# Patient Record
Sex: MALE | Race: WHITE | Employment: OTHER | URBAN - METROPOLITAN AREA
[De-identification: names, ages, dates, MRNs, and addresses within clinical notes are randomized per-mention and may not be internally consistent; named-entity substitution may affect disease eponyms.]

---

## 2017-01-04 PROBLEM — G47.33 OBSTRUCTIVE SLEEP APNEA: Status: ACTIVE | Noted: 2017-01-04

## 2017-02-20 ENCOUNTER — HOSPITAL ENCOUNTER (OUTPATIENT)
Dept: CT IMAGING | Age: 72
Discharge: HOME | End: 2017-02-20
Attending: UROLOGY | Admitting: UROLOGY

## 2017-02-20 DIAGNOSIS — N28.89 OTHER SPECIFIED DISORDER OF KIDNEY AND URETER: ICD-10-CM

## 2018-02-23 PROBLEM — G93.40 ENCEPHALOPATHY ACUTE: Status: ACTIVE | Noted: 2018-02-23

## 2018-03-06 ENCOUNTER — HOSPITAL ENCOUNTER (OUTPATIENT)
Dept: ULTRASOUND IMAGING | Age: 73
Discharge: HOME | End: 2018-03-06
Attending: UROLOGY | Admitting: UROLOGY

## 2018-03-06 DIAGNOSIS — N28.89 OTHER SPECIFIED DISORDERS OF KIDNEY AND URETER: ICD-10-CM

## 2018-03-07 ENCOUNTER — HOSPITAL ENCOUNTER (OUTPATIENT)
Dept: INTERVENTIONAL RADIOLOGY/VASCULAR | Age: 73
Discharge: HOME | End: 2018-04-18
Attending: RADIOLOGY | Admitting: RADIOLOGY

## 2018-05-04 PROBLEM — E04.2 MULTINODULAR GOITER (NONTOXIC): Status: ACTIVE | Noted: 2018-05-04

## 2018-11-13 ENCOUNTER — HOSPITAL ENCOUNTER (OUTPATIENT)
Dept: CARDIAC CATH/INVASIVE PROCEDURES | Age: 73
Setting detail: OBSERVATION
Discharge: HOME | End: 2018-11-15
Attending: INTERNAL MEDICINE | Admitting: INTERNAL MEDICINE
Payer: MEDICARE

## 2018-11-13 PROBLEM — Q24.9 CARDIAC ABNORMALITY: Status: ACTIVE | Noted: 2018-11-13

## 2018-11-13 PROBLEM — I73.9 PAD (PERIPHERAL ARTERY DISEASE) (HCC): Status: ACTIVE | Noted: 2018-11-13

## 2018-11-13 LAB
ACTIVATED CLOTTING TIME, LOW RANGE: 253 SEC
ACTIVATED CLOTTING TIME, LOW RANGE: 313 SEC
ANION GAP SERPL CALCULATED.3IONS-SCNC: 11 MMOL/L (ref 4–16)
APTT: 28.9 SECONDS (ref 21.2–33)
BUN BLDV-MCNC: 32 MG/DL (ref 6–23)
CALCIUM SERPL-MCNC: 8.8 MG/DL (ref 8.3–10.6)
CHLORIDE BLD-SCNC: 106 MMOL/L (ref 99–110)
CO2: 27 MMOL/L (ref 21–32)
CREAT SERPL-MCNC: 1.4 MG/DL (ref 0.9–1.3)
ESTIMATED AVERAGE GLUCOSE: 194 MG/DL
GFR AFRICAN AMERICAN: 60 ML/MIN/1.73M2
GFR NON-AFRICAN AMERICAN: 49 ML/MIN/1.73M2
GLUCOSE BLD-MCNC: 135 MG/DL (ref 70–99)
GLUCOSE BLD-MCNC: 167 MG/DL (ref 70–99)
GLUCOSE BLD-MCNC: 176 MG/DL (ref 70–99)
GLUCOSE BLD-MCNC: 195 MG/DL (ref 70–99)
HBA1C MFR BLD: 8.4 % (ref 4.2–6.3)
HCT VFR BLD CALC: 35.9 % (ref 42–52)
HEMOGLOBIN: 11.7 GM/DL (ref 13.5–18)
INR BLD: 0.99 INDEX
MCH RBC QN AUTO: 30.6 PG (ref 27–31)
MCHC RBC AUTO-ENTMCNC: 32.6 % (ref 32–36)
MCV RBC AUTO: 94 FL (ref 78–100)
PDW BLD-RTO: 14.5 % (ref 11.7–14.9)
PLATELET # BLD: 181 K/CU MM (ref 140–440)
PMV BLD AUTO: 9.6 FL (ref 7.5–11.1)
POTASSIUM SERPL-SCNC: 4.2 MMOL/L (ref 3.5–5.1)
PROTHROMBIN TIME: 11.5 SECONDS (ref 9.12–12.5)
RBC # BLD: 3.82 M/CU MM (ref 4.6–6.2)
SODIUM BLD-SCNC: 144 MMOL/L (ref 135–145)
WBC # BLD: 11.2 K/CU MM (ref 4–10.5)

## 2018-11-13 PROCEDURE — 85027 COMPLETE CBC AUTOMATED: CPT

## 2018-11-13 PROCEDURE — 80048 BASIC METABOLIC PNL TOTAL CA: CPT

## 2018-11-13 PROCEDURE — 6370000000 HC RX 637 (ALT 250 FOR IP)

## 2018-11-13 PROCEDURE — 2140000000 HC CCU INTERMEDIATE R&B

## 2018-11-13 PROCEDURE — 6370000000 HC RX 637 (ALT 250 FOR IP): Performed by: INTERNAL MEDICINE

## 2018-11-13 PROCEDURE — 85347 COAGULATION TIME ACTIVATED: CPT

## 2018-11-13 PROCEDURE — G0378 HOSPITAL OBSERVATION PER HR: HCPCS

## 2018-11-13 PROCEDURE — 82962 GLUCOSE BLOOD TEST: CPT

## 2018-11-13 PROCEDURE — 6360000002 HC RX W HCPCS

## 2018-11-13 PROCEDURE — 2709999900 HC NON-CHARGEABLE SUPPLY

## 2018-11-13 PROCEDURE — 37228 HC TIB PER TERRITORY PLASTY: CPT

## 2018-11-13 PROCEDURE — C1725 CATH, TRANSLUMIN NON-LASER: HCPCS

## 2018-11-13 PROCEDURE — 2580000003 HC RX 258: Performed by: INTERNAL MEDICINE

## 2018-11-13 PROCEDURE — 83036 HEMOGLOBIN GLYCOSYLATED A1C: CPT

## 2018-11-13 PROCEDURE — 2500000003 HC RX 250 WO HCPCS

## 2018-11-13 PROCEDURE — C1887 CATHETER, GUIDING: HCPCS

## 2018-11-13 PROCEDURE — C1769 GUIDE WIRE: HCPCS

## 2018-11-13 PROCEDURE — 75710 ARTERY X-RAYS ARM/LEG: CPT

## 2018-11-13 PROCEDURE — 85610 PROTHROMBIN TIME: CPT

## 2018-11-13 PROCEDURE — 6360000004 HC RX CONTRAST MEDICATION

## 2018-11-13 PROCEDURE — 85730 THROMBOPLASTIN TIME PARTIAL: CPT

## 2018-11-13 PROCEDURE — C1894 INTRO/SHEATH, NON-LASER: HCPCS

## 2018-11-13 PROCEDURE — C1760 CLOSURE DEV, VASC: HCPCS

## 2018-11-13 RX ORDER — ONDANSETRON 2 MG/ML
4 INJECTION INTRAMUSCULAR; INTRAVENOUS EVERY 6 HOURS PRN
Status: DISCONTINUED | OUTPATIENT
Start: 2018-11-13 | End: 2018-11-15 | Stop reason: HOSPADM

## 2018-11-13 RX ORDER — OXYCODONE HYDROCHLORIDE AND ACETAMINOPHEN 5; 325 MG/1; MG/1
1 TABLET ORAL EVERY 4 HOURS PRN
Status: DISCONTINUED | OUTPATIENT
Start: 2018-11-13 | End: 2018-11-15 | Stop reason: HOSPADM

## 2018-11-13 RX ORDER — PANTOPRAZOLE SODIUM 40 MG/1
40 TABLET, DELAYED RELEASE ORAL DAILY
Status: DISCONTINUED | OUTPATIENT
Start: 2018-11-13 | End: 2018-11-15 | Stop reason: HOSPADM

## 2018-11-13 RX ORDER — DEXTROSE MONOHYDRATE 25 G/50ML
12.5 INJECTION, SOLUTION INTRAVENOUS PRN
Status: DISCONTINUED | OUTPATIENT
Start: 2018-11-13 | End: 2018-11-13 | Stop reason: SDUPTHER

## 2018-11-13 RX ORDER — CARVEDILOL 25 MG/1
25 TABLET ORAL 2 TIMES DAILY WITH MEALS
Status: DISCONTINUED | OUTPATIENT
Start: 2018-11-13 | End: 2018-11-15 | Stop reason: HOSPADM

## 2018-11-13 RX ORDER — CLOPIDOGREL BISULFATE 75 MG/1
75 TABLET ORAL DAILY
Status: DISCONTINUED | OUTPATIENT
Start: 2018-11-13 | End: 2018-11-15 | Stop reason: HOSPADM

## 2018-11-13 RX ORDER — DIAZEPAM 5 MG/1
5 TABLET ORAL ONCE
Status: COMPLETED | OUTPATIENT
Start: 2018-11-13 | End: 2018-11-13

## 2018-11-13 RX ORDER — DEXTROSE MONOHYDRATE 50 MG/ML
100 INJECTION, SOLUTION INTRAVENOUS PRN
Status: DISCONTINUED | OUTPATIENT
Start: 2018-11-13 | End: 2018-11-13 | Stop reason: SDUPTHER

## 2018-11-13 RX ORDER — DEXTROSE MONOHYDRATE 25 G/50ML
12.5 INJECTION, SOLUTION INTRAVENOUS PRN
Status: DISCONTINUED | OUTPATIENT
Start: 2018-11-13 | End: 2018-11-15 | Stop reason: HOSPADM

## 2018-11-13 RX ORDER — GLIMEPIRIDE 4 MG/1
4 TABLET ORAL
Status: DISCONTINUED | OUTPATIENT
Start: 2018-11-14 | End: 2018-11-13

## 2018-11-13 RX ORDER — GLIMEPIRIDE 1 MG/1
1 TABLET ORAL
Status: DISCONTINUED | OUTPATIENT
Start: 2018-11-14 | End: 2018-11-15 | Stop reason: HOSPADM

## 2018-11-13 RX ORDER — NICOTINE POLACRILEX 4 MG
15 LOZENGE BUCCAL PRN
Status: DISCONTINUED | OUTPATIENT
Start: 2018-11-13 | End: 2018-11-15 | Stop reason: HOSPADM

## 2018-11-13 RX ORDER — ACETAMINOPHEN 325 MG/1
650 TABLET ORAL EVERY 4 HOURS PRN
Status: DISCONTINUED | OUTPATIENT
Start: 2018-11-13 | End: 2018-11-15 | Stop reason: HOSPADM

## 2018-11-13 RX ORDER — MECLIZINE HCL 12.5 MG/1
12.5 TABLET ORAL 2 TIMES DAILY
Status: DISCONTINUED | OUTPATIENT
Start: 2018-11-13 | End: 2018-11-14

## 2018-11-13 RX ORDER — DEXTROSE MONOHYDRATE 50 MG/ML
100 INJECTION, SOLUTION INTRAVENOUS PRN
Status: DISCONTINUED | OUTPATIENT
Start: 2018-11-13 | End: 2018-11-15 | Stop reason: HOSPADM

## 2018-11-13 RX ORDER — ZOLPIDEM TARTRATE 5 MG/1
5 TABLET ORAL NIGHTLY PRN
Status: DISCONTINUED | OUTPATIENT
Start: 2018-11-13 | End: 2018-11-15 | Stop reason: HOSPADM

## 2018-11-13 RX ORDER — SODIUM CHLORIDE 0.9 % (FLUSH) 0.9 %
10 SYRINGE (ML) INJECTION EVERY 12 HOURS SCHEDULED
Status: DISCONTINUED | OUTPATIENT
Start: 2018-11-13 | End: 2018-11-15 | Stop reason: HOSPADM

## 2018-11-13 RX ORDER — ACETAMINOPHEN 500 MG
500 TABLET ORAL EVERY 6 HOURS PRN
COMMUNITY
End: 2019-01-01 | Stop reason: ALTCHOICE

## 2018-11-13 RX ORDER — ATROPINE SULFATE 0.4 MG/ML
0.5 AMPUL (ML) INJECTION
Status: DISPENSED | OUTPATIENT
Start: 2018-11-13 | End: 2018-11-13

## 2018-11-13 RX ORDER — BUSPIRONE HYDROCHLORIDE 5 MG/1
5 TABLET ORAL 2 TIMES DAILY PRN
Status: DISCONTINUED | OUTPATIENT
Start: 2018-11-13 | End: 2018-11-15 | Stop reason: HOSPADM

## 2018-11-13 RX ORDER — TAMSULOSIN HYDROCHLORIDE 0.4 MG/1
0.4 CAPSULE ORAL NIGHTLY
Status: DISCONTINUED | OUTPATIENT
Start: 2018-11-13 | End: 2018-11-15 | Stop reason: HOSPADM

## 2018-11-13 RX ORDER — ONDANSETRON 2 MG/ML
INJECTION INTRAMUSCULAR; INTRAVENOUS
Status: COMPLETED
Start: 2018-11-13 | End: 2018-11-13

## 2018-11-13 RX ORDER — DIPHENHYDRAMINE HCL 25 MG
25 TABLET ORAL ONCE
Status: COMPLETED | OUTPATIENT
Start: 2018-11-13 | End: 2018-11-13

## 2018-11-13 RX ORDER — MORPHINE SULFATE 2 MG/ML
1 INJECTION, SOLUTION INTRAMUSCULAR; INTRAVENOUS
Status: ACTIVE | OUTPATIENT
Start: 2018-11-13 | End: 2018-11-13

## 2018-11-13 RX ORDER — SODIUM CHLORIDE 0.9 % (FLUSH) 0.9 %
10 SYRINGE (ML) INJECTION PRN
Status: DISCONTINUED | OUTPATIENT
Start: 2018-11-13 | End: 2018-11-15 | Stop reason: HOSPADM

## 2018-11-13 RX ORDER — GLIMEPIRIDE 1 MG/1
1 TABLET ORAL
COMMUNITY
End: 2019-01-01 | Stop reason: SDUPTHER

## 2018-11-13 RX ORDER — POTASSIUM CHLORIDE 750 MG/1
10 TABLET, FILM COATED, EXTENDED RELEASE ORAL DAILY
Status: DISCONTINUED | OUTPATIENT
Start: 2018-11-13 | End: 2018-11-14

## 2018-11-13 RX ORDER — SODIUM CHLORIDE 9 MG/ML
INJECTION, SOLUTION INTRAVENOUS CONTINUOUS
Status: DISCONTINUED | OUTPATIENT
Start: 2018-11-13 | End: 2018-11-15 | Stop reason: HOSPADM

## 2018-11-13 RX ORDER — NICOTINE POLACRILEX 4 MG
15 LOZENGE BUCCAL PRN
Status: DISCONTINUED | OUTPATIENT
Start: 2018-11-13 | End: 2018-11-13 | Stop reason: SDUPTHER

## 2018-11-13 RX ORDER — FUROSEMIDE 40 MG/1
40 TABLET ORAL 2 TIMES DAILY
Status: DISCONTINUED | OUTPATIENT
Start: 2018-11-13 | End: 2018-11-14

## 2018-11-13 RX ORDER — SODIUM CHLORIDE 9 MG/ML
INJECTION, SOLUTION INTRAVENOUS CONTINUOUS
Status: DISCONTINUED | OUTPATIENT
Start: 2018-11-13 | End: 2018-11-13 | Stop reason: SDUPTHER

## 2018-11-13 RX ORDER — ISOSORBIDE MONONITRATE 30 MG/1
30 TABLET, EXTENDED RELEASE ORAL DAILY
Status: DISCONTINUED | OUTPATIENT
Start: 2018-11-13 | End: 2018-11-14

## 2018-11-13 RX ORDER — NITROGLYCERIN 0.4 MG/1
0.4 TABLET SUBLINGUAL EVERY 5 MIN PRN
Status: DISCONTINUED | OUTPATIENT
Start: 2018-11-13 | End: 2018-11-15 | Stop reason: HOSPADM

## 2018-11-13 RX ADMIN — MECLIZINE 12.5 MG: 12.5 TABLET ORAL at 21:28

## 2018-11-13 RX ADMIN — ONDANSETRON 4 MG: 2 INJECTION, SOLUTION INTRAMUSCULAR; INTRAVENOUS at 14:57

## 2018-11-13 RX ADMIN — ZOLPIDEM TARTRATE 5 MG: 5 TABLET ORAL at 21:28

## 2018-11-13 RX ADMIN — FUROSEMIDE 40 MG: 40 TABLET ORAL at 18:22

## 2018-11-13 RX ADMIN — INSULIN LISPRO 1 UNITS: 100 INJECTION, SOLUTION INTRAVENOUS; SUBCUTANEOUS at 18:17

## 2018-11-13 RX ADMIN — POTASSIUM CHLORIDE 10 MEQ: 750 TABLET, FILM COATED, EXTENDED RELEASE ORAL at 14:42

## 2018-11-13 RX ADMIN — SODIUM CHLORIDE: 9 INJECTION, SOLUTION INTRAVENOUS at 08:01

## 2018-11-13 RX ADMIN — PANTOPRAZOLE SODIUM 40 MG: 40 TABLET, DELAYED RELEASE ORAL at 14:42

## 2018-11-13 RX ADMIN — OXYCODONE HYDROCHLORIDE AND ACETAMINOPHEN 1 TABLET: 5; 325 TABLET ORAL at 14:42

## 2018-11-13 RX ADMIN — DIPHENHYDRAMINE HCL 25 MG: 25 TABLET ORAL at 08:01

## 2018-11-13 RX ADMIN — TAMSULOSIN HYDROCHLORIDE 0.4 MG: 0.4 CAPSULE ORAL at 21:28

## 2018-11-13 RX ADMIN — ONDANSETRON 4 MG: 2 INJECTION INTRAMUSCULAR; INTRAVENOUS at 14:57

## 2018-11-13 RX ADMIN — SODIUM CHLORIDE, PRESERVATIVE FREE 10 ML: 5 INJECTION INTRAVENOUS at 21:29

## 2018-11-13 RX ADMIN — CARVEDILOL 25 MG: 25 TABLET, FILM COATED ORAL at 18:22

## 2018-11-13 RX ADMIN — INSULIN LISPRO 1 UNITS: 100 INJECTION, SOLUTION INTRAVENOUS; SUBCUTANEOUS at 21:28

## 2018-11-13 RX ADMIN — DIAZEPAM 5 MG: 5 TABLET ORAL at 08:02

## 2018-11-13 RX ADMIN — ISOSORBIDE MONONITRATE 30 MG: 30 TABLET, EXTENDED RELEASE ORAL at 14:42

## 2018-11-13 RX ADMIN — SODIUM CHLORIDE: 9 INJECTION, SOLUTION INTRAVENOUS at 19:33

## 2018-11-13 ASSESSMENT — PAIN SCALES - GENERAL
PAINLEVEL_OUTOF10: 1
PAINLEVEL_OUTOF10: 7

## 2018-11-13 ASSESSMENT — PAIN DESCRIPTION - LOCATION: LOCATION: OTHER (COMMENT)

## 2018-11-13 ASSESSMENT — PAIN DESCRIPTION - ORIENTATION: ORIENTATION: RIGHT

## 2018-11-13 ASSESSMENT — PAIN DESCRIPTION - FREQUENCY: FREQUENCY: INTERMITTENT

## 2018-11-13 ASSESSMENT — PAIN DESCRIPTION - PAIN TYPE: TYPE: CHRONIC PAIN

## 2018-11-13 ASSESSMENT — PAIN DESCRIPTION - DESCRIPTORS: DESCRIPTORS: DISCOMFORT

## 2018-11-13 NOTE — CONSULTS
Oral Daily    meclizine  12.5 mg Oral BID    [START ON 11/14/2018] dapagliflozin  5 mg Oral QAM    [START ON 11/14/2018] glimepiride  1 mg Oral Daily with breakfast    carvedilol  25 mg Oral BID WC    sodium chloride flush  10 mL Intravenous 2 times per day    insulin lispro  0-6 Units Subcutaneous TID WC    insulin lispro  0-3 Units Subcutaneous Nightly    Insulin Degludec  50 Units Subcutaneous Nightly      Infusions:    sodium chloride 75 mL/hr at 11/13/18 1400    dextrose           IMPRESSION    Patient Active Problem List   Diagnosis    Hypertension    Chest pain    Angina at rest [facility])    Hyperlipidemia    CAD (coronary artery disease)    Diabetes mellitus ([facility] [address]    Obstructive sleep apnea    Encephalopathy acute    Multinodular goiter (nontoxic)    PAD (peripheral artery disease) (HCC)    Cardiac abnormality         Angioplasty og Right leg arteries 11/13/2018       RECOMMENDATIONS:      1. Reviewed POC blood glucose . Labs and X ray results   2. Reviewed Home and Current Medicines   3. Will Start On / Correction bolus Humalog/ Lantus/ or Tresiba  Insulin regime / OHGD   4. Monitor Blood glucose frequently   5. Modify  the dose of Insulin/ OHGD  as needed        Will follow with you  Again thank you for sharing pt's care with me.      Truly yours,       Mallory Ibrahim MD

## 2018-11-13 NOTE — PROGRESS NOTES
Kaia served Dr. Kenroy Sams. Isabela as prt is requesting pain medication. States he hurts from left knee to left hip area. No change in appearance of cath site since received from cath lab.

## 2018-11-13 NOTE — OP NOTE
[facility] --[id number]  RIGHT COMMON PATENT  RIGHT INTERNAL/EXTERNAL/CFA/SFA/PROFUNDA AND POPLITEAL PATENT  RIGHT % TO 10% BALLOON PTA  RIGHT PERONEAL PATENT  RIGHT %  FOOT AT/PT PRESENT  NO COMPLICATIONS  ASA/PLAVIX AND HEPARIN  LEFT GROIN ACCESS   ANGIOSEAL PLACED   HOME TOMORROW

## 2018-11-13 NOTE — H&P
[address], [address] [place] [address]                       PREOPERATIVE HISTORY AND PHYSICAL    PATIENT NAME: Dianna Burris                  :        1945  MED REC NO:   [id number]                          ROOM:  ACCOUNT NO:   [de-identified]                           ADMIT DATE: 2018  PROVIDER:     Camron Juarez MD    INDICATION:  The patient is here for intervention of the right leg. HISTORY OF PRESENT ILLNESS:  This is a 49-year-old male patient with a  history of having peripheral vascular disease present. The patient is  status post peripheral angiogram done and intervention of the left leg. The patient had ulcer at that time. Now he is here for right below the  knee intervention. The angiogram shows that left AT is 100% occluded.  _____ was performed. Left peroneal was also intervened. _____ was left alone at that time. The patient did have heart catheterization back in , left main is  patent, LAD has mild disease, ramus had ostial 70% stenosis, which is a  small vessel, circ has mild disease, RCA has 50%-70% calcified vessel,  mid stent was patent, and RPD is a small vessel, 90% stenosis. The  patient was treated medically at that time. The patient had peripheral  angiogram done, dominant is patent, bilateral renal artery patent,  bilateral common, external, internal common femoral, SFAs, and profundus  were patent. Right popliteal was patent, right AT was 100% occluded,  right PT was 100% occluded, right femoral artery was patent. The  patient is here for right AT and PT intervention. PAST MEDICAL HISTORY:  History of peripheral vascular disease, history  of coronary artery disease present, hypertension, hyperlipidemia,  diabetes present, sleep apnea, COPD, and obesity present.     PAST SURGICAL HISTORY:  Angioplasty of circumflex artery and RCA,  gallbladder surgery, appendix, left leg below

## 2018-11-13 NOTE — PROGRESS NOTES
Received pt from cath lab. Pt alert and unsure of orientation because pt does not answer questions appropriately, however pt does know that he is in hospital and that he is in [facility]. Pt educated on holding groin site if he has to cough, laugh, or sneeze and the need to remain flat and not sit up or exert pressure to left groin site. Dimple Campa at bedside and helpful with encouraging pt to follow instructions. Left groin site without bleeding or hematoma. Pt denies pain. VSS. No distress noted.

## 2018-11-14 LAB
ANION GAP SERPL CALCULATED.3IONS-SCNC: 11 MMOL/L (ref 4–16)
BUN BLDV-MCNC: 36 MG/DL (ref 6–23)
CALCIUM SERPL-MCNC: 7.8 MG/DL (ref 8.3–10.6)
CHLORIDE BLD-SCNC: 104 MMOL/L (ref 99–110)
CO2: 27 MMOL/L (ref 21–32)
CREAT SERPL-MCNC: 1.7 MG/DL (ref 0.9–1.3)
GFR AFRICAN AMERICAN: 48 ML/MIN/1.73M2
GFR NON-AFRICAN AMERICAN: 39 ML/MIN/1.73M2
GLUCOSE BLD-MCNC: 144 MG/DL (ref 70–99)
GLUCOSE BLD-MCNC: 146 MG/DL (ref 70–99)
GLUCOSE BLD-MCNC: 152 MG/DL (ref 70–99)
GLUCOSE BLD-MCNC: 178 MG/DL (ref 70–99)
GLUCOSE BLD-MCNC: 186 MG/DL (ref 70–99)
GLUCOSE BLD-MCNC: 205 MG/DL (ref 70–99)
HCT VFR BLD CALC: 29.2 % (ref 42–52)
HEMOGLOBIN: 8.8 GM/DL (ref 13.5–18)
MCH RBC QN AUTO: 29.7 PG (ref 27–31)
MCHC RBC AUTO-ENTMCNC: 30.1 % (ref 32–36)
MCV RBC AUTO: 98.6 FL (ref 78–100)
PDW BLD-RTO: 14.6 % (ref 11.7–14.9)
PLATELET # BLD: 155 K/CU MM (ref 140–440)
PMV BLD AUTO: 9.5 FL (ref 7.5–11.1)
POTASSIUM SERPL-SCNC: 4.1 MMOL/L (ref 3.5–5.1)
RBC # BLD: 2.96 M/CU MM (ref 4.6–6.2)
SODIUM BLD-SCNC: 142 MMOL/L (ref 135–145)
WBC # BLD: 10.6 K/CU MM (ref 4–10.5)

## 2018-11-14 PROCEDURE — G0378 HOSPITAL OBSERVATION PER HR: HCPCS

## 2018-11-14 PROCEDURE — 6370000000 HC RX 637 (ALT 250 FOR IP): Performed by: INTERNAL MEDICINE

## 2018-11-14 PROCEDURE — 80048 BASIC METABOLIC PNL TOTAL CA: CPT

## 2018-11-14 PROCEDURE — 82962 GLUCOSE BLOOD TEST: CPT

## 2018-11-14 PROCEDURE — 36415 COLL VENOUS BLD VENIPUNCTURE: CPT

## 2018-11-14 PROCEDURE — 2580000003 HC RX 258: Performed by: INTERNAL MEDICINE

## 2018-11-14 PROCEDURE — 85027 COMPLETE CBC AUTOMATED: CPT

## 2018-11-14 RX ORDER — INSULIN GLARGINE 100 [IU]/ML
50 INJECTION, SOLUTION SUBCUTANEOUS NIGHTLY
Status: DISCONTINUED | OUTPATIENT
Start: 2018-11-14 | End: 2018-11-15 | Stop reason: HOSPADM

## 2018-11-14 RX ADMIN — INSULIN LISPRO 1 UNITS: 100 INJECTION, SOLUTION INTRAVENOUS; SUBCUTANEOUS at 20:28

## 2018-11-14 RX ADMIN — CARVEDILOL 25 MG: 25 TABLET, FILM COATED ORAL at 10:04

## 2018-11-14 RX ADMIN — CARVEDILOL 25 MG: 25 TABLET, FILM COATED ORAL at 16:42

## 2018-11-14 RX ADMIN — TAMSULOSIN HYDROCHLORIDE 0.4 MG: 0.4 CAPSULE ORAL at 20:29

## 2018-11-14 RX ADMIN — CLOPIDOGREL BISULFATE 75 MG: 75 TABLET ORAL at 10:04

## 2018-11-14 RX ADMIN — SODIUM CHLORIDE: 9 INJECTION, SOLUTION INTRAVENOUS at 23:24

## 2018-11-14 RX ADMIN — SODIUM CHLORIDE: 9 INJECTION, SOLUTION INTRAVENOUS at 10:23

## 2018-11-14 RX ADMIN — GLIMEPIRIDE 1 MG: 1 TABLET ORAL at 10:03

## 2018-11-14 RX ADMIN — INSULIN LISPRO 1 UNITS: 100 INJECTION, SOLUTION INTRAVENOUS; SUBCUTANEOUS at 12:41

## 2018-11-14 RX ADMIN — PANTOPRAZOLE SODIUM 40 MG: 40 TABLET, DELAYED RELEASE ORAL at 10:04

## 2018-11-14 RX ADMIN — INSULIN LISPRO 2 UNITS: 100 INJECTION, SOLUTION INTRAVENOUS; SUBCUTANEOUS at 16:42

## 2018-11-14 RX ADMIN — INSULIN LISPRO 1 UNITS: 100 INJECTION, SOLUTION INTRAVENOUS; SUBCUTANEOUS at 08:05

## 2018-11-14 ASSESSMENT — PAIN SCALES - GENERAL
PAINLEVEL_OUTOF10: 0
PAINLEVEL_OUTOF10: 0

## 2018-11-14 NOTE — PROGRESS NOTES
Pt has been calm and cooperative since he developed hematoma left groin with associated low BP, etc.

## 2018-11-15 VITALS
HEART RATE: 67 BPM | DIASTOLIC BLOOD PRESSURE: 69 MMHG | SYSTOLIC BLOOD PRESSURE: 157 MMHG | BODY MASS INDEX: 47.74 KG/M2 | RESPIRATION RATE: 18 BRPM | HEIGHT: 68 IN | WEIGHT: 315 LBS | TEMPERATURE: 98.6 F | OXYGEN SATURATION: 98 %

## 2018-11-15 LAB
ANION GAP SERPL CALCULATED.3IONS-SCNC: 9 MMOL/L (ref 4–16)
BUN BLDV-MCNC: 29 MG/DL (ref 6–23)
CALCIUM SERPL-MCNC: 7.8 MG/DL (ref 8.3–10.6)
CHLORIDE BLD-SCNC: 106 MMOL/L (ref 99–110)
CO2: 26 MMOL/L (ref 21–32)
CREAT SERPL-MCNC: 1.5 MG/DL (ref 0.9–1.3)
GFR AFRICAN AMERICAN: 55 ML/MIN/1.73M2
GFR NON-AFRICAN AMERICAN: 46 ML/MIN/1.73M2
GLUCOSE BLD-MCNC: 138 MG/DL (ref 70–99)
GLUCOSE BLD-MCNC: 143 MG/DL (ref 70–99)
GLUCOSE BLD-MCNC: 188 MG/DL (ref 70–99)
HCT VFR BLD CALC: 28.7 % (ref 42–52)
HEMOGLOBIN: 8.8 GM/DL (ref 13.5–18)
MCH RBC QN AUTO: 29.7 PG (ref 27–31)
MCHC RBC AUTO-ENTMCNC: 30.7 % (ref 32–36)
MCV RBC AUTO: 97 FL (ref 78–100)
PDW BLD-RTO: 14.4 % (ref 11.7–14.9)
PLATELET # BLD: 133 K/CU MM (ref 140–440)
PMV BLD AUTO: 9.6 FL (ref 7.5–11.1)
POTASSIUM SERPL-SCNC: 4.1 MMOL/L (ref 3.5–5.1)
RBC # BLD: 2.96 M/CU MM (ref 4.6–6.2)
SODIUM BLD-SCNC: 141 MMOL/L (ref 135–145)
WBC # BLD: 9.3 K/CU MM (ref 4–10.5)

## 2018-11-15 PROCEDURE — 96374 THER/PROPH/DIAG INJ IV PUSH: CPT

## 2018-11-15 PROCEDURE — G0378 HOSPITAL OBSERVATION PER HR: HCPCS

## 2018-11-15 PROCEDURE — 6370000000 HC RX 637 (ALT 250 FOR IP): Performed by: INTERNAL MEDICINE

## 2018-11-15 PROCEDURE — 85027 COMPLETE CBC AUTOMATED: CPT

## 2018-11-15 PROCEDURE — 2580000003 HC RX 258: Performed by: INTERNAL MEDICINE

## 2018-11-15 PROCEDURE — 36415 COLL VENOUS BLD VENIPUNCTURE: CPT

## 2018-11-15 PROCEDURE — 94761 N-INVAS EAR/PLS OXIMETRY MLT: CPT

## 2018-11-15 PROCEDURE — 80048 BASIC METABOLIC PNL TOTAL CA: CPT

## 2018-11-15 PROCEDURE — 82962 GLUCOSE BLOOD TEST: CPT

## 2018-11-15 PROCEDURE — 6360000002 HC RX W HCPCS: Performed by: INTERNAL MEDICINE

## 2018-11-15 RX ADMIN — CLOPIDOGREL BISULFATE 75 MG: 75 TABLET ORAL at 09:30

## 2018-11-15 RX ADMIN — PANTOPRAZOLE SODIUM 40 MG: 40 TABLET, DELAYED RELEASE ORAL at 09:30

## 2018-11-15 RX ADMIN — SODIUM CHLORIDE, PRESERVATIVE FREE 10 ML: 5 INJECTION INTRAVENOUS at 09:47

## 2018-11-15 RX ADMIN — GLIMEPIRIDE 1 MG: 1 TABLET ORAL at 09:30

## 2018-11-15 RX ADMIN — ONDANSETRON 4 MG: 2 INJECTION, SOLUTION INTRAMUSCULAR; INTRAVENOUS at 09:43

## 2018-11-15 RX ADMIN — CARVEDILOL 25 MG: 25 TABLET, FILM COATED ORAL at 09:30

## 2018-11-15 NOTE — DISCHARGE SUMMARY
Dictated --[id number]  Stable condition  Cardiac diet  Activity per post cath  F/u in office in one week  Hold lasix and xarelto for two days

## 2018-11-15 NOTE — PROGRESS NOTES
Progress Note( Dr. Cervantes Draft)  11/15/2018  Subjective:   Admit Date: 11/13/2018  PCP: James De Leon MD    Admitted For :PAD . Claudication     Consulted For: Better control of DM     Interval History:  As usual Very confused     Developed Hematoma at the leg as pt did not stay in the bed after Angioplasty  as advised . Denies any chest pains,   Has  SOB . Denies nausea or vomiting. No new bowel or bladder symptoms. Intake/Output Summary (Last 24 hours) at 11/15/18 0714  Last data filed at 11/14/18 1630   Gross per 24 hour   Intake             4070 ml   Output              400 ml   Net             3670 ml       DATA    CBC:   Recent Labs      11/13/18   0730  11/14/18   0433  11/15/18   0415   WBC  11.2*  10.6*  9.3   HGB  11.7*  8.8*  8.8*   PLT  181  155  133*    CMP:  Recent Labs      11/13/18   0730  11/14/18   0433  11/15/18   0415   NA  144  142  141   K  4.2  4.1  4.1   CL  106  104  106   CO2  27  27  26   BUN  32*  36*  29*   CREATININE  1.4*  1.7*  1.5*   CALCIUM  8.8  7.8*  7.8*     Lipids:   Lab Results   Component Value Date    CHOL 241 02/25/2018    HDL 42 02/25/2018    TRIG 278 02/25/2018     Glucose:  Recent Labs      11/14/18   1638  11/14/18   2022  11/14/18   2237   POCGLU  205*  178*  144*     [id number]:  Lab Results   Component Value Date    LABA1C 8.4 11/13/2018     High Sensitivity TSH:   Lab Results   Component Value Date    TSHHS 0.480 02/23/2018     Free T3:   Lab Results   Component Value Date    FT3 1.5 02/25/2018     Free T4:  Lab Results   Component Value Date    T4FREE 0.89 02/25/2018       No results found.     Scheduled Medicines   Medications:    insulin glargine  50 Units Subcutaneous Nightly    clopidogrel  75 mg Oral Daily    pantoprazole  40 mg Oral Daily    tamsulosin  0.4 mg Oral Nightly    dapagliflozin  5 mg Oral QAM    glimepiride  1 mg Oral Daily with breakfast    carvedilol  25 mg Oral BID WC    sodium chloride flush  10 mL Intravenous 2 times per day    insulin lispro  0-6 Units Subcutaneous TID     insulin lispro  0-3 Units Subcutaneous Nightly      Infusions:    sodium chloride 75 mL/hr at 11/14/18 2324    dextrose           Objective:   Vitals: BP (!) 149/65   Pulse 71   Temp 98.1 °F (36.7 °C) (Oral)   Resp 19   Ht 5' 8\" (1.727 m)   Wt (!) 320 lb (145.2 kg)   SpO2 97%   BMI 48.66 kg/m²   General appearance: alert and cooperative with exam  Neck: no JVD or bruit  Thyroid : Normal lobes   Lungs: Has Vesicular Breath sounds   Heart:  regular rate and rhythm  Abdomen: soft, non-tender; bowel sounds normal; no masses,  no organomegaly  Musculoskeletal: Normal  Extremities: extremities normal, , no edema leg hematoma   Neurologic:  Awake, alert, oriented to name, place and time. Cranial nerves II-XII are grossly intact. Motor is  intact. Sensory neuropathy ,  and gait is abnormal.    Assessment:     Patient Active Problem List:     Hypertension     Chest pain     Angina at rest [facility])     Hyperlipidemia     CAD (coronary artery disease)     Diabetes mellitus (HCC)     Obstructive sleep apnea     Encephalopathy acute     Multinodular goiter (nontoxic)     PAD (peripheral artery disease) (HCC)     Cardiac abnormality      Plan:     1. Reviewed POC blood glucose . Labs and X ray results   2. Reviewed Current Medicines   3. On meal/ Correction bolus Humalog/ Basal Lantus Insulin regime s   4. Monitor Blood glucose frequently   5. Modified  the dose of Insulin/ other medicines as needed   6. Will follow   7. Home today     .      Jorge Rollins MD

## 2018-11-16 NOTE — DISCHARGE SUMMARY
[address] [place] [address], [address] [facility]                               DISCHARGE SUMMARY    PATIENT NAME: Carolina Solis                  :        1945  MED REC NO:   [id number]                          ROOM:       3108  ACCOUNT NO:   [de-identified]                           ADMIT DATE: 2018  PROVIDER:     Rebecca Chaidez MD                  DISCHARGE DATE:  11/15/2018    The patient will be discharged home on Farxiga, Amaryl 1 mg a day,  Tylenol as needed and insulin and Trulicity, potassium 10 mEq once a  day, Ambien, Protonix, Flomax 0.4 mg a day, Lasix 40 mg a day will be  started on . Plavix 75 mg daily, received now. Coreg 25 mg  b.i.d. _____ as needed, Xarelto 20 mg a day will be started on . BuSpar and CPAP machine and Imdur, nitroglycerin. This is a 38-year-old male patient who came to the hospital few days  ago, underwent a peripheral intervention. Right anterior tibial artery  was intervened. The patient post procedure stood up and had developed  hematoma in the left groin. Creatinine went up to 1.7. Therefore, his  baseline is around is 1.4 and 1.5 and he is hydrated. His creatinine is  1.5 today. Hemoglobin went from 11.7 down to 8.8 and has remained  stable. He has a significant ecchymosis in the groin present. The patient is clinically stable, feeling better. There is no other  complication noted. His vital are stable. We are going to discharge  him home. He will be receiving his Lasix and Xarelto in a few days  _____ to heal.    Activity for a post cath. Follow up in the office in one week. We will  repeat his electrolytes and CBC in one week in the office also. FINAL DIAGNOSES:  1. Peripheral vascular disease status post intervention of the right  anterior tibial artery. 2.  Hypertension. 3.  Hyperlipidemia. 4.  Diabetes. 5.  Coronary artery disease. 6.  Anemia.   7.

## 2018-12-24 ENCOUNTER — HOSPITAL ENCOUNTER (INPATIENT)
Age: 73
LOS: 4 days | Discharge: HOME | DRG: 871 | End: 2018-12-28
Attending: EMERGENCY MEDICINE | Admitting: INTERNAL MEDICINE
Payer: MEDICARE

## 2018-12-24 ENCOUNTER — APPOINTMENT (OUTPATIENT)
Dept: GENERAL RADIOLOGY | Age: 73
DRG: 871 | End: 2018-12-24
Payer: MEDICARE

## 2018-12-24 ENCOUNTER — APPOINTMENT (OUTPATIENT)
Dept: ULTRASOUND IMAGING | Age: 73
DRG: 871 | End: 2018-12-24
Payer: MEDICARE

## 2018-12-24 DIAGNOSIS — R77.8 ELEVATED TROPONIN: ICD-10-CM

## 2018-12-24 DIAGNOSIS — D72.829 LEUKOCYTOSIS, UNSPECIFIED TYPE: ICD-10-CM

## 2018-12-24 DIAGNOSIS — R07.9 CHEST PAIN, UNSPECIFIED TYPE: ICD-10-CM

## 2018-12-24 DIAGNOSIS — M86.9 OSTEOMYELITIS OF RIGHT FOOT, UNSPECIFIED TYPE (HCC): Primary | ICD-10-CM

## 2018-12-24 DIAGNOSIS — N18.9 CHRONIC KIDNEY DISEASE, UNSPECIFIED CKD STAGE: ICD-10-CM

## 2018-12-24 DIAGNOSIS — Z86.79 HISTORY OF PERIPHERAL ARTERIAL DISEASE: ICD-10-CM

## 2018-12-24 LAB
ALBUMIN SERPL-MCNC: 3.7 GM/DL (ref 3.4–5)
ALP BLD-CCNC: 65 IU/L (ref 40–128)
ALT SERPL-CCNC: 17 U/L (ref 10–40)
ANION GAP SERPL CALCULATED.3IONS-SCNC: 14 MMOL/L (ref 4–16)
AST SERPL-CCNC: 17 IU/L (ref 15–37)
BASOPHILS ABSOLUTE: 0.1 K/CU MM
BASOPHILS RELATIVE PERCENT: 0.3 % (ref 0–1)
BILIRUB SERPL-MCNC: 0.3 MG/DL (ref 0–1)
BUN BLDV-MCNC: 31 MG/DL (ref 6–23)
CALCIUM SERPL-MCNC: 9.1 MG/DL (ref 8.3–10.6)
CHLORIDE BLD-SCNC: 99 MMOL/L (ref 99–110)
CO2: 23 MMOL/L (ref 21–32)
CREAT SERPL-MCNC: 1.4 MG/DL (ref 0.9–1.3)
DIFFERENTIAL TYPE: ABNORMAL
EOSINOPHILS ABSOLUTE: 0.3 K/CU MM
EOSINOPHILS RELATIVE PERCENT: 1.2 % (ref 0–3)
GFR AFRICAN AMERICAN: 60 ML/MIN/1.73M2
GFR NON-AFRICAN AMERICAN: 49 ML/MIN/1.73M2
GLUCOSE BLD-MCNC: 154 MG/DL (ref 70–99)
GLUCOSE BLD-MCNC: 163 MG/DL (ref 70–99)
HCT VFR BLD CALC: 37.6 % (ref 42–52)
HEMOGLOBIN: 11.6 GM/DL (ref 13.5–18)
IMMATURE NEUTROPHIL %: 0.5 % (ref 0–0.43)
LYMPHOCYTES ABSOLUTE: 0.9 K/CU MM
LYMPHOCYTES RELATIVE PERCENT: 4.4 % (ref 24–44)
MCH RBC QN AUTO: 29.6 PG (ref 27–31)
MCHC RBC AUTO-ENTMCNC: 30.9 % (ref 32–36)
MCV RBC AUTO: 95.9 FL (ref 78–100)
MONOCYTES ABSOLUTE: 1.1 K/CU MM
MONOCYTES RELATIVE PERCENT: 5.3 % (ref 0–4)
NUCLEATED RBC %: 0 %
PDW BLD-RTO: 14.7 % (ref 11.7–14.9)
PLATELET # BLD: 192 K/CU MM (ref 140–440)
PMV BLD AUTO: 9.8 FL (ref 7.5–11.1)
POTASSIUM SERPL-SCNC: 4.3 MMOL/L (ref 3.5–5.1)
PRO-BNP: 3282 PG/ML
RAPID INFLUENZA  B AGN: NEGATIVE
RAPID INFLUENZA A AGN: NEGATIVE
RBC # BLD: 3.92 M/CU MM (ref 4.6–6.2)
SEGMENTED NEUTROPHILS ABSOLUTE COUNT: 18.1 K/CU MM
SEGMENTED NEUTROPHILS RELATIVE PERCENT: 88.3 % (ref 36–66)
SODIUM BLD-SCNC: 136 MMOL/L (ref 135–145)
TOTAL IMMATURE NEUTOROPHIL: 0.11 K/CU MM
TOTAL NUCLEATED RBC: 0 K/CU MM
TOTAL PROTEIN: 7.4 GM/DL (ref 6.4–8.2)
TROPONIN T: 0.02 NG/ML
WBC # BLD: 20.5 K/CU MM (ref 4–10.5)

## 2018-12-24 PROCEDURE — 6370000000 HC RX 637 (ALT 250 FOR IP): Performed by: INTERNAL MEDICINE

## 2018-12-24 PROCEDURE — 93925 LOWER EXTREMITY STUDY: CPT

## 2018-12-24 PROCEDURE — 84484 ASSAY OF TROPONIN QUANT: CPT

## 2018-12-24 PROCEDURE — 93005 ELECTROCARDIOGRAM TRACING: CPT | Performed by: PHYSICIAN ASSISTANT

## 2018-12-24 PROCEDURE — 73630 X-RAY EXAM OF FOOT: CPT

## 2018-12-24 PROCEDURE — 6360000002 HC RX W HCPCS: Performed by: PHYSICIAN ASSISTANT

## 2018-12-24 PROCEDURE — 83880 ASSAY OF NATRIURETIC PEPTIDE: CPT

## 2018-12-24 PROCEDURE — 80053 COMPREHEN METABOLIC PANEL: CPT

## 2018-12-24 PROCEDURE — 99285 EMERGENCY DEPT VISIT HI MDM: CPT

## 2018-12-24 PROCEDURE — 6360000002 HC RX W HCPCS: Performed by: EMERGENCY MEDICINE

## 2018-12-24 PROCEDURE — 2060000000 HC ICU INTERMEDIATE R&B

## 2018-12-24 PROCEDURE — 85025 COMPLETE CBC W/AUTO DIFF WBC: CPT

## 2018-12-24 PROCEDURE — 71045 X-RAY EXAM CHEST 1 VIEW: CPT

## 2018-12-24 PROCEDURE — 36415 COLL VENOUS BLD VENIPUNCTURE: CPT

## 2018-12-24 PROCEDURE — 96374 THER/PROPH/DIAG INJ IV PUSH: CPT

## 2018-12-24 PROCEDURE — 87804 INFLUENZA ASSAY W/OPTIC: CPT

## 2018-12-24 PROCEDURE — 2580000003 HC RX 258: Performed by: INTERNAL MEDICINE

## 2018-12-24 PROCEDURE — 82962 GLUCOSE BLOOD TEST: CPT

## 2018-12-24 PROCEDURE — 6360000002 HC RX W HCPCS: Performed by: INTERNAL MEDICINE

## 2018-12-24 RX ORDER — VANCOMYCIN HYDROCHLORIDE 1 G/200ML
1000 INJECTION, SOLUTION INTRAVENOUS ONCE
Status: COMPLETED | OUTPATIENT
Start: 2018-12-24 | End: 2018-12-24

## 2018-12-24 RX ORDER — SODIUM CHLORIDE 0.9 % (FLUSH) 0.9 %
10 SYRINGE (ML) INJECTION EVERY 12 HOURS SCHEDULED
Status: DISCONTINUED | OUTPATIENT
Start: 2018-12-24 | End: 2018-12-28 | Stop reason: HOSPADM

## 2018-12-24 RX ORDER — ZOLPIDEM TARTRATE 5 MG/1
5 TABLET ORAL NIGHTLY PRN
Status: DISCONTINUED | OUTPATIENT
Start: 2018-12-24 | End: 2018-12-28 | Stop reason: HOSPADM

## 2018-12-24 RX ORDER — TAMSULOSIN HYDROCHLORIDE 0.4 MG/1
0.4 CAPSULE ORAL DAILY
Status: DISCONTINUED | OUTPATIENT
Start: 2018-12-25 | End: 2018-12-28 | Stop reason: HOSPADM

## 2018-12-24 RX ORDER — SODIUM CHLORIDE 9 MG/ML
INJECTION, SOLUTION INTRAVENOUS CONTINUOUS
Status: DISCONTINUED | OUTPATIENT
Start: 2018-12-24 | End: 2018-12-27

## 2018-12-24 RX ORDER — SODIUM CHLORIDE 0.9 % (FLUSH) 0.9 %
10 SYRINGE (ML) INJECTION PRN
Status: DISCONTINUED | OUTPATIENT
Start: 2018-12-24 | End: 2018-12-28 | Stop reason: HOSPADM

## 2018-12-24 RX ORDER — NICOTINE POLACRILEX 4 MG
15 LOZENGE BUCCAL PRN
Status: DISCONTINUED | OUTPATIENT
Start: 2018-12-24 | End: 2018-12-28 | Stop reason: HOSPADM

## 2018-12-24 RX ORDER — PANTOPRAZOLE SODIUM 40 MG/1
40 TABLET, DELAYED RELEASE ORAL DAILY
Status: DISCONTINUED | OUTPATIENT
Start: 2018-12-25 | End: 2018-12-28 | Stop reason: HOSPADM

## 2018-12-24 RX ORDER — NITROGLYCERIN 0.4 MG/1
0.4 TABLET SUBLINGUAL EVERY 5 MIN PRN
Status: DISCONTINUED | OUTPATIENT
Start: 2018-12-24 | End: 2018-12-28 | Stop reason: HOSPADM

## 2018-12-24 RX ORDER — ONDANSETRON 2 MG/ML
4 INJECTION INTRAMUSCULAR; INTRAVENOUS EVERY 30 MIN PRN
Status: DISCONTINUED | OUTPATIENT
Start: 2018-12-24 | End: 2018-12-24

## 2018-12-24 RX ORDER — INSULIN GLARGINE 100 [IU]/ML
50 INJECTION, SOLUTION SUBCUTANEOUS NIGHTLY
Status: DISCONTINUED | OUTPATIENT
Start: 2018-12-24 | End: 2018-12-28

## 2018-12-24 RX ORDER — VANCOMYCIN HYDROCHLORIDE 1 G/200ML
1000 INJECTION, SOLUTION INTRAVENOUS ONCE
Status: COMPLETED | OUTPATIENT
Start: 2018-12-24 | End: 2018-12-25

## 2018-12-24 RX ORDER — LORAZEPAM 2 MG/ML
1 INJECTION INTRAMUSCULAR SEE ADMIN INSTRUCTIONS
Status: DISCONTINUED | OUTPATIENT
Start: 2018-12-24 | End: 2018-12-28 | Stop reason: HOSPADM

## 2018-12-24 RX ORDER — POTASSIUM CHLORIDE 750 MG/1
10 TABLET, FILM COATED, EXTENDED RELEASE ORAL DAILY
Status: DISCONTINUED | OUTPATIENT
Start: 2018-12-25 | End: 2018-12-27

## 2018-12-24 RX ORDER — DEXTROSE MONOHYDRATE 25 G/50ML
12.5 INJECTION, SOLUTION INTRAVENOUS PRN
Status: DISCONTINUED | OUTPATIENT
Start: 2018-12-24 | End: 2018-12-28 | Stop reason: HOSPADM

## 2018-12-24 RX ORDER — ACETAMINOPHEN 500 MG
500 TABLET ORAL EVERY 6 HOURS PRN
Status: DISCONTINUED | OUTPATIENT
Start: 2018-12-24 | End: 2018-12-26

## 2018-12-24 RX ORDER — ONDANSETRON 2 MG/ML
4 INJECTION INTRAMUSCULAR; INTRAVENOUS EVERY 6 HOURS PRN
Status: DISCONTINUED | OUTPATIENT
Start: 2018-12-24 | End: 2018-12-28 | Stop reason: HOSPADM

## 2018-12-24 RX ORDER — CLOPIDOGREL BISULFATE 75 MG/1
75 TABLET ORAL DAILY
Status: DISCONTINUED | OUTPATIENT
Start: 2018-12-25 | End: 2018-12-28 | Stop reason: HOSPADM

## 2018-12-24 RX ORDER — DEXTROSE MONOHYDRATE 50 MG/ML
100 INJECTION, SOLUTION INTRAVENOUS PRN
Status: DISCONTINUED | OUTPATIENT
Start: 2018-12-24 | End: 2018-12-28 | Stop reason: HOSPADM

## 2018-12-24 RX ORDER — CARVEDILOL 25 MG/1
25 TABLET ORAL 2 TIMES DAILY WITH MEALS
Status: DISCONTINUED | OUTPATIENT
Start: 2018-12-25 | End: 2018-12-28 | Stop reason: HOSPADM

## 2018-12-24 RX ADMIN — INSULIN LISPRO 2 UNITS: 100 INJECTION, SOLUTION INTRAVENOUS; SUBCUTANEOUS at 22:56

## 2018-12-24 RX ADMIN — VANCOMYCIN HYDROCHLORIDE 1000 MG: 1 INJECTION, SOLUTION INTRAVENOUS at 22:57

## 2018-12-24 RX ADMIN — ONDANSETRON 4 MG: 2 INJECTION INTRAMUSCULAR; INTRAVENOUS at 19:02

## 2018-12-24 RX ADMIN — ACETAMINOPHEN 500 MG: 500 TABLET, FILM COATED ORAL at 22:56

## 2018-12-24 RX ADMIN — RIVAROXABAN 20 MG: 20 TABLET, FILM COATED ORAL at 23:32

## 2018-12-24 RX ADMIN — SODIUM CHLORIDE: 9 INJECTION, SOLUTION INTRAVENOUS at 22:03

## 2018-12-24 RX ADMIN — VANCOMYCIN HYDROCHLORIDE 1000 MG: 1 INJECTION, SOLUTION INTRAVENOUS at 19:59

## 2018-12-24 RX ADMIN — SODIUM CHLORIDE, PRESERVATIVE FREE 10 ML: 5 INJECTION INTRAVENOUS at 22:02

## 2018-12-24 RX ADMIN — TAZOBACTAM SODIUM AND PIPERACILLIN SODIUM 3.38 G: 375; 3 INJECTION, SOLUTION INTRAVENOUS at 22:02

## 2018-12-24 NOTE — ED PROVIDER NOTES
weakness  Psychiatric:  No anxiety  Genitourinary:  No dysuria, no hematuria  Extremities:  See HPI    Past Medical History:   Diagnosis Date    Acute metabolic encephalopathy     dx with admission 2/23/2018- also dx with Influenza A- recieved Tamiflu    CAD (coronary artery disease)     Diabetes mellitus ([facility] [address]     Hx of blood clots 2016    Hyperlipidemia     Hypertension      Past Surgical History:   Procedure Laterality Date    APPENDECTOMY      CATARACT REMOVAL Left     CHOLECYSTECTOMY      CORONARY ANGIOPLASTY WITH STENT PLACEMENT      per old chart pt had cath- angioplastyof left circimflex artery with stent placement done 4/2011(pc)    CORONARY ANGIOPLASTY WITH STENT PLACEMENT      per old chart pt had cath and then angioplasty with stents to circumflex, OM and RCA done 4/2016(pc)    EYE SURGERY      OTHER SURGICAL HISTORY Right 04/03/2018    Thyroid Biopsy     Family History   Problem Relation Age of Onset    Diabetes Mother     Heart Disease Mother     High Cholesterol Mother     Cancer Father     Heart Disease Father     High Cholesterol Father      Social History     Social History    Marital status:      Spouse name: N/A    Number of children: 2    Years of education: N/A     Occupational History    Not on file.      Social History Main Topics    Smoking status: Never Smoker    Smokeless tobacco: Never Used    Alcohol use No    Drug use: No    Sexual activity: No     Other Topics Concern    Not on file     Social History Narrative    No narrative on file     Current Facility-Administered Medications   Medication Dose Route Frequency Provider Last Rate Last Dose    ondansetron (ZOFRAN) injection 4 mg  4 mg Intravenous Q30 Min PRN Korinne Lusterio, PA-C   4 mg at 12/24/18 1902    vancomycin (VANCOCIN) 1000 mg in dextrose 5% 200 mL IVPB  1,000 mg Intravenous Once Piter Gorman MD        cefepime (MAXIPIME) 2 g in dextrose 5 % 50 mL IVPB  2 g Intravenous Once Rubeal S CBC auto diff   Result Value Ref Range    WBC 20.5 (H) 4.0 - 10.5 K/CU MM    RBC 3.92 (L) 4.6 - 6.2 M/CU MM    Hemoglobin 11.6 (L) 13.5 - 18.0 GM/DL    Hematocrit 37.6 (L) 42 - 52 %    MCV 95.9 78 - 100 FL    MCH 29.6 27 - 31 PG    MCHC 30.9 (L) 32.0 - 36.0 %    RDW 14.7 11.7 - 14.9 %    Platelets 361 933 - 045 K/CU MM    MPV 9.8 7.5 - 11.1 FL    Differential Type AUTOMATED DIFFERENTIAL     Segs Relative 88.3 (H) 36 - 66 %    Lymphocytes % 4.4 (L) 24 - 44 %    Monocytes % 5.3 (H) 0 - 4 %    Eosinophils % 1.2 0 - 3 %    Basophils % 0.3 0 - 1 %    Segs Absolute 18.1 K/CU MM    Lymphocytes # 0.9 K/CU MM    Monocytes # 1.1 K/CU MM    Eosinophils # 0.3 K/CU MM    Basophils # 0.1 K/CU MM    Nucleated RBC % 0.0 %    Total Nucleated RBC 0.0 K/CU MM    Total Immature Neutrophil 0.11 K/CU MM    Immature Neutrophil % 0.5 (H) 0 - 0.43 %   CMP   Result Value Ref Range    Sodium 136 135 - 145 MMOL/L    Potassium 4.3 3.5 - 5.1 MMOL/L    Chloride 99 99 - 110 mMol/L    CO2 23 21 - 32 MMOL/L    BUN 31 (H) 6 - 23 MG/DL    CREATININE 1.4 (H) 0.9 - 1.3 MG/DL    Glucose 154 (H) 70 - 99 MG/DL    Calcium 9.1 8.3 - 10.6 MG/DL    Alb 3.7 3.4 - 5.0 GM/DL    Total Protein 7.4 6.4 - 8.2 GM/DL    Total Bilirubin 0.3 0.0 - 1.0 MG/DL    ALT 17 10 - 40 U/L    AST 17 15 - 37 IU/L    Alkaline Phosphatase 65 40 - 128 IU/L    GFR Non- 49 (L) >60 mL/min/1.73m2    GFR  60 (L) >60 mL/min/1.73m2    Anion Gap 14 4 - 16   Troponin   Result Value Ref Range    Troponin T 0.017 (H) <0.01 NG/ML   Brain Natriuretic Peptide   Result Value Ref Range    Pro-BNP 3,282 (H) <300 PG/ML   EKG 12 Lead   Result Value Ref Range    Ventricular Rate 81 BPM    Atrial Rate 81 BPM    P-R Interval 178 ms    QRS Duration 94 ms    Q-T Interval 370 ms    QTc Calculation (Bazett) 429 ms    P Axis 47 degrees    R Axis -47 degrees    T Axis 144 degrees    Diagnosis       Sinus rhythm with occasional premature ventricular complexes  Left anterior ([facility] [address]    2. Chest pain, unspecified type    3. Elevated troponin    4. Leukocytosis, unspecified type    5. History of peripheral arterial disease        Patient presents with family with concern for chest pain, bilateral toe discoloration and drainage and overall illness. On exam, fatigued nontoxic 58-year-old male. Obese, afebrile in no acute distress. Lungs are clear to auscultation. Abdomen soft nontender. No asymmetry of the bilateral lower calves, calves are supple nontender. There is erythematous warm discoloration and drainage from the right fourth toe and left second toe without erythema or warmth extending onto the plantar or foot dorsum. Patient is started on IV fluids and Zofran. No acute EKG changes for ischemic changes. CBC with leukocytosis of 20.5, CMP with elevated BUN and creatinine, similar to baseline. Troponin is also elevated at 0.017 which is trended upward since previous. BNP also elevated 3282. Negative rapid influenza. Chest x-ray reveals no evidence of acute pulmonary pulmonary disease. Venous Doppler of the lower extremities shows no significant stenosis in the common femoral, SFA or popliteal circulation bilaterally however there is known runoff disease with nonvisualization patient of the right perineal, distal right RONALD and mid to distal left PTA. Left foot x-ray was unremarkable. Right foot x-ray shows moderate changes along the proximal base of the fourth and fifth proximal phalanx is questionable for early osteomyelitis versus other etiology and recommending for MRI of the right forefoot with and without contrast.  Given patient's elevated white count and imaging findings, will start patient on IV cefepime and vancomycin for osteomyelitis. No evidence of ischemic limb at this time or palpable abscess. ED attending physician did speak with hospitalist medicine will admit patient for further evaluation and care.   At this time, patient will be admitted in stable

## 2018-12-25 LAB
ANION GAP SERPL CALCULATED.3IONS-SCNC: 15 MMOL/L (ref 4–16)
BUN BLDV-MCNC: 31 MG/DL (ref 6–23)
CALCIUM SERPL-MCNC: 8.5 MG/DL (ref 8.3–10.6)
CHLORIDE BLD-SCNC: 99 MMOL/L (ref 99–110)
CO2: 25 MMOL/L (ref 21–32)
CREAT SERPL-MCNC: 1.8 MG/DL (ref 0.9–1.3)
EKG ATRIAL RATE: 81 BPM
EKG DIAGNOSIS: NORMAL
EKG P AXIS: 47 DEGREES
EKG P-R INTERVAL: 178 MS
EKG Q-T INTERVAL: 370 MS
EKG QRS DURATION: 94 MS
EKG QTC CALCULATION (BAZETT): 429 MS
EKG R AXIS: -47 DEGREES
EKG T AXIS: 144 DEGREES
EKG VENTRICULAR RATE: 81 BPM
ESTIMATED AVERAGE GLUCOSE: 154 MG/DL
GFR AFRICAN AMERICAN: 45 ML/MIN/1.73M2
GFR NON-AFRICAN AMERICAN: 37 ML/MIN/1.73M2
GLUCOSE BLD-MCNC: 166 MG/DL (ref 70–99)
GLUCOSE BLD-MCNC: 169 MG/DL (ref 70–99)
GLUCOSE BLD-MCNC: 170 MG/DL (ref 70–99)
GLUCOSE BLD-MCNC: 226 MG/DL (ref 70–99)
GLUCOSE BLD-MCNC: 229 MG/DL (ref 70–99)
HBA1C MFR BLD: 7 % (ref 4.2–6.3)
HCT VFR BLD CALC: 34.8 % (ref 42–52)
HEMOGLOBIN: 10.8 GM/DL (ref 13.5–18)
MCH RBC QN AUTO: 29.7 PG (ref 27–31)
MCHC RBC AUTO-ENTMCNC: 31 % (ref 32–36)
MCV RBC AUTO: 95.6 FL (ref 78–100)
PDW BLD-RTO: 14.7 % (ref 11.7–14.9)
PLATELET # BLD: 152 K/CU MM (ref 140–440)
PMV BLD AUTO: 9.6 FL (ref 7.5–11.1)
POTASSIUM SERPL-SCNC: 4.6 MMOL/L (ref 3.5–5.1)
RBC # BLD: 3.64 M/CU MM (ref 4.6–6.2)
SODIUM BLD-SCNC: 139 MMOL/L (ref 135–145)
TROPONIN T: 0.03 NG/ML
WBC # BLD: 28.2 K/CU MM (ref 4–10.5)

## 2018-12-25 PROCEDURE — 84484 ASSAY OF TROPONIN QUANT: CPT

## 2018-12-25 PROCEDURE — 6370000000 HC RX 637 (ALT 250 FOR IP): Performed by: INTERNAL MEDICINE

## 2018-12-25 PROCEDURE — 93010 ELECTROCARDIOGRAM REPORT: CPT | Performed by: INTERNAL MEDICINE

## 2018-12-25 PROCEDURE — 2700000000 HC OXYGEN THERAPY PER DAY

## 2018-12-25 PROCEDURE — 2060000000 HC ICU INTERMEDIATE R&B

## 2018-12-25 PROCEDURE — 94761 N-INVAS EAR/PLS OXIMETRY MLT: CPT

## 2018-12-25 PROCEDURE — 2580000003 HC RX 258: Performed by: INTERNAL MEDICINE

## 2018-12-25 PROCEDURE — 82962 GLUCOSE BLOOD TEST: CPT

## 2018-12-25 PROCEDURE — 36415 COLL VENOUS BLD VENIPUNCTURE: CPT

## 2018-12-25 PROCEDURE — 85027 COMPLETE CBC AUTOMATED: CPT

## 2018-12-25 PROCEDURE — 6360000002 HC RX W HCPCS: Performed by: INTERNAL MEDICINE

## 2018-12-25 PROCEDURE — 94660 CPAP INITIATION&MGMT: CPT

## 2018-12-25 PROCEDURE — 80048 BASIC METABOLIC PNL TOTAL CA: CPT

## 2018-12-25 PROCEDURE — 83036 HEMOGLOBIN GLYCOSYLATED A1C: CPT

## 2018-12-25 RX ADMIN — ACETAMINOPHEN 500 MG: 500 TABLET, FILM COATED ORAL at 19:35

## 2018-12-25 RX ADMIN — INSULIN LISPRO 11 UNITS: 100 INJECTION, SOLUTION INTRAVENOUS; SUBCUTANEOUS at 13:24

## 2018-12-25 RX ADMIN — SODIUM CHLORIDE, PRESERVATIVE FREE 10 ML: 5 INJECTION INTRAVENOUS at 09:52

## 2018-12-25 RX ADMIN — INSULIN LISPRO 3 UNITS: 100 INJECTION, SOLUTION INTRAVENOUS; SUBCUTANEOUS at 21:53

## 2018-12-25 RX ADMIN — TAMSULOSIN HYDROCHLORIDE 0.4 MG: 0.4 CAPSULE ORAL at 09:50

## 2018-12-25 RX ADMIN — POTASSIUM CHLORIDE 10 MEQ: 750 TABLET, FILM COATED, EXTENDED RELEASE ORAL at 09:50

## 2018-12-25 RX ADMIN — TAZOBACTAM SODIUM AND PIPERACILLIN SODIUM 3.38 G: 375; 3 INJECTION, SOLUTION INTRAVENOUS at 17:41

## 2018-12-25 RX ADMIN — PANTOPRAZOLE SODIUM 40 MG: 40 TABLET, DELAYED RELEASE ORAL at 05:55

## 2018-12-25 RX ADMIN — INSULIN LISPRO 3 UNITS: 100 INJECTION, SOLUTION INTRAVENOUS; SUBCUTANEOUS at 17:56

## 2018-12-25 RX ADMIN — SODIUM CHLORIDE: 9 INJECTION, SOLUTION INTRAVENOUS at 17:31

## 2018-12-25 RX ADMIN — TAZOBACTAM SODIUM AND PIPERACILLIN SODIUM 3.38 G: 375; 3 INJECTION, SOLUTION INTRAVENOUS at 21:53

## 2018-12-25 RX ADMIN — TAZOBACTAM SODIUM AND PIPERACILLIN SODIUM 3.38 G: 375; 3 INJECTION, SOLUTION INTRAVENOUS at 04:07

## 2018-12-25 RX ADMIN — CARVEDILOL 25 MG: 25 TABLET, FILM COATED ORAL at 17:41

## 2018-12-25 RX ADMIN — CLOPIDOGREL BISULFATE 75 MG: 75 TABLET ORAL at 09:50

## 2018-12-25 RX ADMIN — TAZOBACTAM SODIUM AND PIPERACILLIN SODIUM 3.38 G: 375; 3 INJECTION, SOLUTION INTRAVENOUS at 09:51

## 2018-12-25 RX ADMIN — INSULIN LISPRO 6 UNITS: 100 INJECTION, SOLUTION INTRAVENOUS; SUBCUTANEOUS at 13:21

## 2018-12-25 RX ADMIN — CARVEDILOL 25 MG: 25 TABLET, FILM COATED ORAL at 09:50

## 2018-12-25 RX ADMIN — RIVAROXABAN 15 MG: 15 TABLET, FILM COATED ORAL at 17:41

## 2018-12-25 RX ADMIN — ONDANSETRON 4 MG: 2 INJECTION INTRAMUSCULAR; INTRAVENOUS at 09:06

## 2018-12-25 RX ADMIN — INSULIN GLARGINE 50 UNITS: 100 INJECTION, SOLUTION SUBCUTANEOUS at 21:54

## 2018-12-25 RX ADMIN — INSULIN LISPRO 3 UNITS: 100 INJECTION, SOLUTION INTRAVENOUS; SUBCUTANEOUS at 09:55

## 2018-12-25 RX ADMIN — ACETAMINOPHEN 500 MG: 500 TABLET, FILM COATED ORAL at 09:51

## 2018-12-25 RX ADMIN — INSULIN LISPRO 11 UNITS: 100 INJECTION, SOLUTION INTRAVENOUS; SUBCUTANEOUS at 17:58

## 2018-12-25 ASSESSMENT — PAIN DESCRIPTION - ONSET: ONSET: ON-GOING

## 2018-12-25 ASSESSMENT — PAIN SCALES - GENERAL
PAINLEVEL_OUTOF10: 0
PAINLEVEL_OUTOF10: 0
PAINLEVEL_OUTOF10: 4
PAINLEVEL_OUTOF10: 0
PAINLEVEL_OUTOF10: 2
PAINLEVEL_OUTOF10: 0
PAINLEVEL_OUTOF10: 0
PAINLEVEL_OUTOF10: 2
PAINLEVEL_OUTOF10: 0

## 2018-12-25 ASSESSMENT — PAIN DESCRIPTION - PAIN TYPE
TYPE: ACUTE PAIN
TYPE: ACUTE PAIN

## 2018-12-25 ASSESSMENT — PAIN DESCRIPTION - LOCATION
LOCATION: HEAD
LOCATION: HEAD

## 2018-12-25 ASSESSMENT — PAIN DESCRIPTION - DESCRIPTORS
DESCRIPTORS: DULL;HEADACHE
DESCRIPTORS: HEADACHE

## 2018-12-25 ASSESSMENT — PAIN DESCRIPTION - FREQUENCY: FREQUENCY: CONTINUOUS

## 2018-12-25 ASSESSMENT — PAIN DESCRIPTION - ORIENTATION: ORIENTATION: ANTERIOR

## 2018-12-25 ASSESSMENT — PAIN DESCRIPTION - PROGRESSION: CLINICAL_PROGRESSION: GRADUALLY WORSENING

## 2018-12-25 NOTE — ED NOTES
Report called to mary on 1 [facility], [address] [place] [address]  12/24/18 2038

## 2018-12-25 NOTE — H&P
History and Physical      Name:  Chan Bermeo /Age/Sex: 1945  (74 y.o. male)   MRN & CSN:  [id number] & [id number] Admission Date/Time: 2018  4:26 PM   Location:  ED28/ED-28 PCP: Stephanie Arriola MD       Hospital Day: 1    Assessment and Plan:   Chan Bermeo is a 74 y.o.  male  who presents with red toes    Cellulitis of right 2nd toe and left 4th toe  -IV abx - Zosyn and Vanco    Erosive changes on x-ray at 4th and 5th phalanges with possible osteomyelitis  - check MRI with and without contrast, consult infectious disease    PAD - consult Dr. Shannon Ugalde, discussed with him via [employer]    Chest pain - vague, and a minor complaint - rule out MI    DMII - endocrine consult      History of Present Illness:     Chief Complaint:   Chan Bermeo is a 74 y.o.  male  who presents with a variety of complaints. I clarified with the family, chest pain was a minor. He was not feeling well today, and his some of his toes turned red, just like when he had an infection in the past per family. He has known PAD. 10-14 could not be done as he is confused  -no bleeding, but he is disoriented per family    Objective:   No intake or output data in the 24 hours ending 18   Vitals:   Vitals:    18   BP:    Pulse: 89   Resp:    Temp:    SpO2:      Physical Exam:    GEN Awake male, well developed, Body mass index is 48.5 kg/m². EYES Pupils are equally round. No scleral erythema, discharge, or conjunctivitis. RESP Clear to auscultation, no wheezes, rales or rhonchi. Symmetric chest movement while on room air. CARDIO/VASC S1/S2 auscultated. Regular rate without appreciable murmurs, rubs, or gallops. GI Abdomen is soft without significant tenderness  MSK No gross joint deformities. Spontaneous movement of all extremities  SKIN Normal coloration, warm, dry. NEURO Cranial nerves appear grossly intact, normal speech, no lateralizing weakness.   PSYCH Awake, alert, oriented x 4.  Affect appropriate. Past Medical History: PMHx:   Past Medical History:   Diagnosis Date    Acute metabolic encephalopathy     dx with admission 2/23/2018- also dx with Influenza A- recieved Tamiflu    CAD (coronary artery disease)     Diabetes mellitus ([facility] [address]     Hx of blood clots 2016    Hyperlipidemia     Hypertension      Patient Active Problem List    Diagnosis Date Noted    PAD (peripheral artery disease) ([facility] [address] 11/13/2018    Cardiac abnormality 11/13/2018    Multinodular goiter (nontoxic) 05/04/2018    Encephalopathy acute 02/23/2018    Obstructive sleep apnea 01/04/2017    Hypertension 10/17/2013    Chest pain 10/17/2013    Angina at rest [facility]) 10/17/2013    Hyperlipidemia 10/17/2013    CAD (coronary artery disease) 10/17/2013    Diabetes mellitus ([facility] 10/17/2013     PSHx:  has a past surgical history that includes Cholecystectomy; Appendectomy; eye surgery; Cataract removal (Left); Coronary angioplasty with stent; Coronary angioplasty with stent; and other surgical history (Right, 04/03/2018). Allergies: Allergies   Allergen Reactions    Amlodipine     Eliquis [Apixaban] Diarrhea    Norco [Hydrocodone-Acetaminophen] Nausea Only     Home Medications:      Antonio Suazo   Home Medication Instructions [id number]    Printed on:12/24/18 1944   Medication Information                      acetaminophen (TYLENOL) 500 MG tablet  Take 500 mg by mouth every 6 hours as needed for Pain             busPIRone (BUSPAR) 5 MG tablet  Take 5 mg by mouth 2 times daily as needed (Anxiety)              carvedilol (COREG) 25 MG tablet  Take 1 tablet by mouth 2 times daily (with meals).              clopidogrel (PLAVIX) 75 MG tablet  Take 1 tablet by mouth daily             CPAP Machine MISC  14 cm by CPAP route nightly             dapagliflozin (FARXIGA) 5 MG tablet  Take 5 mg by mouth every morning             Dulaglutide (TRULICITY) 1.5 LE/6.9NO SOPN  Inject into the skin once a week

## 2018-12-26 ENCOUNTER — APPOINTMENT (OUTPATIENT)
Dept: MRI IMAGING | Age: 73
DRG: 871 | End: 2018-12-26
Payer: MEDICARE

## 2018-12-26 LAB
ANION GAP SERPL CALCULATED.3IONS-SCNC: 15 MMOL/L (ref 4–16)
BACTERIA: NEGATIVE /HPF
BILIRUBIN URINE: NEGATIVE MG/DL
BLOOD, URINE: NEGATIVE
BUN BLDV-MCNC: 39 MG/DL (ref 6–23)
CALCIUM SERPL-MCNC: 8.1 MG/DL (ref 8.3–10.6)
CHLORIDE BLD-SCNC: 99 MMOL/L (ref 99–110)
CLARITY: CLEAR
CO2: 24 MMOL/L (ref 21–32)
COLOR: YELLOW
CREAT SERPL-MCNC: 1.9 MG/DL (ref 0.9–1.3)
CREATININE URINE: 104 MG/DL (ref 39–259)
GFR AFRICAN AMERICAN: 42 ML/MIN/1.73M2
GFR NON-AFRICAN AMERICAN: 35 ML/MIN/1.73M2
GLUCOSE BLD-MCNC: 148 MG/DL (ref 70–99)
GLUCOSE BLD-MCNC: 155 MG/DL (ref 70–99)
GLUCOSE BLD-MCNC: 162 MG/DL (ref 70–99)
GLUCOSE BLD-MCNC: 170 MG/DL (ref 70–99)
GLUCOSE BLD-MCNC: 183 MG/DL (ref 70–99)
GLUCOSE BLD-MCNC: 191 MG/DL (ref 70–99)
GLUCOSE, URINE: 150 MG/DL
KETONES, URINE: NEGATIVE MG/DL
LEUKOCYTE ESTERASE, URINE: NEGATIVE
MUCUS: ABNORMAL HPF
NITRITE URINE, QUANTITATIVE: NEGATIVE
PH, URINE: 5 (ref 5–8)
POTASSIUM SERPL-SCNC: 4 MMOL/L (ref 3.5–5.1)
PROT/CREAT RATIO, UR: 0.2
PROTEIN UA: NEGATIVE MG/DL
RBC URINE: ABNORMAL /HPF (ref 0–3)
SODIUM BLD-SCNC: 138 MMOL/L (ref 135–145)
SODIUM URINE: 14 MMOL/L (ref 35–167)
SPECIFIC GRAVITY UA: 1.02 (ref 1–1.03)
TRICHOMONAS: ABNORMAL /HPF
URINE TOTAL PROTEIN: 23.6 MG/DL
UROBILINOGEN, URINE: NORMAL MG/DL (ref 0.2–1)
VANCOMYCIN RANDOM: 8.9 UG/ML
WBC UA: 1 /HPF (ref 0–2)

## 2018-12-26 PROCEDURE — 2060000000 HC ICU INTERMEDIATE R&B

## 2018-12-26 PROCEDURE — 94761 N-INVAS EAR/PLS OXIMETRY MLT: CPT

## 2018-12-26 PROCEDURE — 97530 THERAPEUTIC ACTIVITIES: CPT

## 2018-12-26 PROCEDURE — 6370000000 HC RX 637 (ALT 250 FOR IP): Performed by: INTERNAL MEDICINE

## 2018-12-26 PROCEDURE — 87186 SC STD MICRODIL/AGAR DIL: CPT

## 2018-12-26 PROCEDURE — 80202 ASSAY OF VANCOMYCIN: CPT

## 2018-12-26 PROCEDURE — 87040 BLOOD CULTURE FOR BACTERIA: CPT

## 2018-12-26 PROCEDURE — 36415 COLL VENOUS BLD VENIPUNCTURE: CPT

## 2018-12-26 PROCEDURE — 6360000002 HC RX W HCPCS: Performed by: INTERNAL MEDICINE

## 2018-12-26 PROCEDURE — 87077 CULTURE AEROBIC IDENTIFY: CPT

## 2018-12-26 PROCEDURE — 2580000003 HC RX 258: Performed by: INTERNAL MEDICINE

## 2018-12-26 PROCEDURE — 76937 US GUIDE VASCULAR ACCESS: CPT

## 2018-12-26 PROCEDURE — 94660 CPAP INITIATION&MGMT: CPT

## 2018-12-26 PROCEDURE — G8978 MOBILITY CURRENT STATUS: HCPCS

## 2018-12-26 PROCEDURE — 84156 ASSAY OF PROTEIN URINE: CPT

## 2018-12-26 PROCEDURE — 84300 ASSAY OF URINE SODIUM: CPT

## 2018-12-26 PROCEDURE — 81001 URINALYSIS AUTO W/SCOPE: CPT

## 2018-12-26 PROCEDURE — 82962 GLUCOSE BLOOD TEST: CPT

## 2018-12-26 PROCEDURE — 51798 US URINE CAPACITY MEASURE: CPT

## 2018-12-26 PROCEDURE — 97162 PT EVAL MOD COMPLEX 30 MIN: CPT

## 2018-12-26 PROCEDURE — 80048 BASIC METABOLIC PNL TOTAL CA: CPT

## 2018-12-26 PROCEDURE — 87071 CULTURE AEROBIC QUANT OTHER: CPT

## 2018-12-26 PROCEDURE — 73718 MRI LOWER EXTREMITY W/O DYE: CPT

## 2018-12-26 PROCEDURE — 82570 ASSAY OF URINE CREATININE: CPT

## 2018-12-26 PROCEDURE — G8979 MOBILITY GOAL STATUS: HCPCS

## 2018-12-26 RX ORDER — LORAZEPAM 2 MG/ML
1 INJECTION INTRAMUSCULAR
Status: ACTIVE | OUTPATIENT
Start: 2018-12-26 | End: 2018-12-26

## 2018-12-26 RX ORDER — METRONIDAZOLE 250 MG/1
500 TABLET ORAL EVERY 8 HOURS SCHEDULED
Status: DISCONTINUED | OUTPATIENT
Start: 2018-12-26 | End: 2018-12-28

## 2018-12-26 RX ORDER — ACETAMINOPHEN 325 MG/1
650 TABLET ORAL EVERY 4 HOURS PRN
Status: DISCONTINUED | OUTPATIENT
Start: 2018-12-26 | End: 2018-12-28 | Stop reason: HOSPADM

## 2018-12-26 RX ADMIN — PANTOPRAZOLE SODIUM 40 MG: 40 TABLET, DELAYED RELEASE ORAL at 06:27

## 2018-12-26 RX ADMIN — CARVEDILOL 25 MG: 25 TABLET, FILM COATED ORAL at 17:42

## 2018-12-26 RX ADMIN — CLOPIDOGREL BISULFATE 75 MG: 75 TABLET ORAL at 08:24

## 2018-12-26 RX ADMIN — METRONIDAZOLE 500 MG: 250 TABLET ORAL at 13:48

## 2018-12-26 RX ADMIN — TAZOBACTAM SODIUM AND PIPERACILLIN SODIUM 3.38 G: 375; 3 INJECTION, SOLUTION INTRAVENOUS at 03:37

## 2018-12-26 RX ADMIN — POTASSIUM CHLORIDE 10 MEQ: 750 TABLET, FILM COATED, EXTENDED RELEASE ORAL at 08:24

## 2018-12-26 RX ADMIN — INSULIN LISPRO 11 UNITS: 100 INJECTION, SOLUTION INTRAVENOUS; SUBCUTANEOUS at 18:33

## 2018-12-26 RX ADMIN — SODIUM CHLORIDE, PRESERVATIVE FREE 10 ML: 5 INJECTION INTRAVENOUS at 23:02

## 2018-12-26 RX ADMIN — SODIUM CHLORIDE: 9 INJECTION, SOLUTION INTRAVENOUS at 09:58

## 2018-12-26 RX ADMIN — INSULIN LISPRO 3 UNITS: 100 INJECTION, SOLUTION INTRAVENOUS; SUBCUTANEOUS at 18:32

## 2018-12-26 RX ADMIN — METRONIDAZOLE 500 MG: 250 TABLET ORAL at 23:00

## 2018-12-26 RX ADMIN — ZOLPIDEM TARTRATE 5 MG: 5 TABLET ORAL at 01:16

## 2018-12-26 RX ADMIN — INSULIN LISPRO 3 UNITS: 100 INJECTION, SOLUTION INTRAVENOUS; SUBCUTANEOUS at 08:26

## 2018-12-26 RX ADMIN — INSULIN GLARGINE 50 UNITS: 100 INJECTION, SOLUTION SUBCUTANEOUS at 20:24

## 2018-12-26 RX ADMIN — ACETAMINOPHEN 500 MG: 500 TABLET, FILM COATED ORAL at 12:09

## 2018-12-26 RX ADMIN — ACETAMINOPHEN 650 MG: 325 TABLET ORAL at 20:23

## 2018-12-26 RX ADMIN — CEFEPIME HYDROCHLORIDE 2 G: 2 INJECTION, POWDER, FOR SOLUTION INTRAVENOUS at 13:48

## 2018-12-26 RX ADMIN — TAMSULOSIN HYDROCHLORIDE 0.4 MG: 0.4 CAPSULE ORAL at 08:24

## 2018-12-26 RX ADMIN — CARVEDILOL 25 MG: 25 TABLET, FILM COATED ORAL at 08:24

## 2018-12-26 RX ADMIN — INSULIN LISPRO 3 UNITS: 100 INJECTION, SOLUTION INTRAVENOUS; SUBCUTANEOUS at 12:55

## 2018-12-26 RX ADMIN — TAZOBACTAM SODIUM AND PIPERACILLIN SODIUM 3.38 G: 375; 3 INJECTION, SOLUTION INTRAVENOUS at 09:58

## 2018-12-26 RX ADMIN — SODIUM CHLORIDE, PRESERVATIVE FREE 10 ML: 5 INJECTION INTRAVENOUS at 08:25

## 2018-12-26 RX ADMIN — INSULIN LISPRO 2 UNITS: 100 INJECTION, SOLUTION INTRAVENOUS; SUBCUTANEOUS at 20:24

## 2018-12-26 RX ADMIN — VANCOMYCIN 2000 MG: 1 INJECTION, SOLUTION INTRAVENOUS at 11:59

## 2018-12-26 RX ADMIN — INSULIN LISPRO 11 UNITS: 100 INJECTION, SOLUTION INTRAVENOUS; SUBCUTANEOUS at 12:56

## 2018-12-26 RX ADMIN — RIVAROXABAN 15 MG: 15 TABLET, FILM COATED ORAL at 17:42

## 2018-12-26 ASSESSMENT — PAIN SCALES - GENERAL
PAINLEVEL_OUTOF10: 2
PAINLEVEL_OUTOF10: 4
PAINLEVEL_OUTOF10: 0
PAINLEVEL_OUTOF10: 0

## 2018-12-26 ASSESSMENT — PAIN DESCRIPTION - PAIN TYPE: TYPE: ACUTE PAIN

## 2018-12-26 ASSESSMENT — PAIN DESCRIPTION - LOCATION: LOCATION: LEG

## 2018-12-26 ASSESSMENT — PAIN DESCRIPTION - ORIENTATION: ORIENTATION: RIGHT;LEFT

## 2018-12-26 NOTE — PROGRESS NOTES
present with small vessel present for which the  patient was treated medically. The patient has peripheral vascular  disease below the knee intervention of both legs and both have been  intervened below the leg in August and November. History of obesity,  hypertension, hyperlipidemia, COPD, and sleep apnea present.     PAST SURGICAL HISTORY:  Angioplasty of the circumflex artery and RCA.     History of gallbladder surgery and appendix removed, below the knee  intervention both legs present.     SOCIAL HISTORY:  He does not smoke, does not drink.     ALLERGIES:  ELIQUIS, AMLODIPINE, and NORCO. MEDICATIONS:  The patient is on his Farxiga, Amaryl, potassium, Xarelto,  Ambien, Protonix, Flomax, nitro, and Coreg.       Objective:   /77   Pulse 76   Temp 99.2 °F (37.3 °C) (Oral)   Resp (!) 33   Ht 5' 8\" (1.727 m)   Wt (!) 319 lb 0.1 oz (144.7 kg)   SpO2 96%   BMI 48.50 kg/m²     Intake/Output Summary (Last 24 hours) at 12/26/18 2160  Last data filed at 12/26/18 1341   Gross per 24 hour   Intake           2418.4 ml   Output              675 ml   Net           1743.4 ml       Medications:   Scheduled Meds:   vancomycin (VANCOCIN) intermittent dosing (placeholder)   Other RX Placeholder    rivaroxaban  15 mg Oral Daily    sodium chloride flush  10 mL Intravenous 2 times per day    LORazepam  1 mg Intravenous See Admin Instructions    clopidogrel  75 mg Oral Daily    tamsulosin  0.4 mg Oral Daily    pantoprazole  40 mg Oral Daily    potassium chloride  10 mEq Oral Daily    carvedilol  25 mg Oral BID WC    insulin glargine  50 Units Subcutaneous Nightly    insulin lispro  0-18 Units Subcutaneous TID WC    insulin lispro  0-9 Units Subcutaneous Nightly    insulin lispro  0.08 Units/kg Subcutaneous TID     piperacillin-tazobactam  3.375 g Intravenous Q6H      Infusions:   sodium chloride 75 mL/hr at 12/25/18 1731    dextrose        PRN Meds:  sodium chloride flush, magnesium hydroxide,

## 2018-12-26 NOTE — PROGRESS NOTES
[address] [facility]  consulted by Dr. Beatrice Galan for monitoring and adjustment. Indication for treatment: cellulitis/osteomyelitis of the right foot   Goal trough: 15 mcg/mL     Pertinent Laboratory Values:   Temp Readings from Last 3 Encounters:   12/26/18 99.2 °F (37.3 °C) (Oral)   11/15/18 98.6 °F (37 °C) (Oral)   08/13/18 97.6 °F (36.4 °C) (Temporal)     Recent Labs      12/24/18   1640  12/25/18   0359   WBC  20.5*  28.2*     Recent Labs      12/24/18   1640  12/25/18   0359  12/26/18   0252   BUN  31*  31*  39*   CREATININE  1.4*  1.8*  1.9*     Estimated Creatinine Clearance: 50 mL/min (A) (based on SCr of 1.8 mg/dL (H)). Intake/Output Summary (Last 24 hours) at 12/26/18 0910  Last data filed at 12/26/18 4189   Gross per 24 hour   Intake           2418.4 ml   Output              675 ml   Net           1743.4 ml       Pertinent Cultures:  Date    Source    Results  12/26   Blood    Sent        Vancomycin level:   TROUGH:  No results for input(s): VANCOTROUGH in the last 72 hours. RANDOM:    Recent Labs      12/26/18   0252   VANCORANDOM  8.9       Assessment:  · WBC and temperature: elevated   · SCr, BUN, and urine output: JOSETTE, (baseline ~1.3)  · Day(s) of therapy: 3  · Vancomycin level: 8.9, appropriate to re-dose    Plan:  · Pulse-dosing based on level   · Mr. Garima Painter received vancomycin 2000 mg IVPB x 1 on 12/24, vancomycin concentration ~30h post-dose ok to re-dose   · Will order vancomycin 2000 mg IVPB x 1, repeat level 24h post-dose  · Pharmacy will continue to monitor patient and adjust therapy as indicated    VANCOMYCIN LEVEL SCHEDULED FOR 12/27 @1200    Thank you for the consult.   Louise WagnerD, BCPS  12/26/2018 9:10 AM

## 2018-12-26 NOTE — PROGRESS NOTES
Subcutaneous TID WC    piperacillin-tazobactam  3.375 g Intravenous Q6H      Infusions:    sodium chloride 75 mL/hr at 12/25/18 1731    dextrose       PRN Meds:   sodium chloride flush 10 mL PRN   magnesium hydroxide 30 mL Daily PRN   ondansetron 4 mg Q6H PRN   acetaminophen 500 mg Q6H PRN   zolpidem 5 mg Nightly PRN   nitroGLYCERIN 0.4 mg Q5 Min PRN   glucose 15 g PRN   dextrose 12.5 g PRN   glucagon (rDNA) 1 mg PRN   dextrose 100 mL/hr PRN         Electronically signed by Crissy Starkey MD on 12/25/2018 at 8:13 PM

## 2018-12-26 NOTE — PROGRESS NOTES
40 mg Oral Daily    potassium chloride  10 mEq Oral Daily    carvedilol  25 mg Oral BID WC    insulin glargine  50 Units Subcutaneous Nightly    insulin lispro  0-18 Units Subcutaneous TID WC    insulin lispro  0-9 Units Subcutaneous Nightly    insulin lispro  0.08 Units/kg Subcutaneous TID     piperacillin-tazobactam  3.375 g Intravenous Q6H      Infusions:    sodium chloride 75 mL/hr at 12/25/18 1731    dextrose       PRN Meds:     sodium chloride flush 10 mL PRN   magnesium hydroxide 30 mL Daily PRN   ondansetron 4 mg Q6H PRN   acetaminophen 500 mg Q6H PRN   zolpidem 5 mg Nightly PRN   nitroGLYCERIN 0.4 mg Q5 Min PRN   glucose 15 g PRN   dextrose 12.5 g PRN   glucagon (rDNA) 1 mg PRN   dextrose 100 mL/hr PRN         Electronically signed by Jailene Gordon MD on 12/26/2018 at 9:17 AM

## 2018-12-27 ENCOUNTER — APPOINTMENT (OUTPATIENT)
Dept: CT IMAGING | Age: 73
DRG: 871 | End: 2018-12-27
Payer: MEDICARE

## 2018-12-27 LAB
ANION GAP SERPL CALCULATED.3IONS-SCNC: 11 MMOL/L (ref 4–16)
BASOPHILS ABSOLUTE: 0 K/CU MM
BASOPHILS RELATIVE PERCENT: 0.3 % (ref 0–1)
BUN BLDV-MCNC: 42 MG/DL (ref 6–23)
CALCIUM SERPL-MCNC: 7.8 MG/DL (ref 8.3–10.6)
CHLORIDE BLD-SCNC: 102 MMOL/L (ref 99–110)
CO2: 24 MMOL/L (ref 21–32)
CREAT SERPL-MCNC: 2.1 MG/DL (ref 0.9–1.3)
DIFFERENTIAL TYPE: ABNORMAL
EOSINOPHILS ABSOLUTE: 0.1 K/CU MM
EOSINOPHILS RELATIVE PERCENT: 0.8 % (ref 0–3)
GFR AFRICAN AMERICAN: 37 ML/MIN/1.73M2
GFR NON-AFRICAN AMERICAN: 31 ML/MIN/1.73M2
GLUCOSE BLD-MCNC: 109 MG/DL (ref 70–99)
GLUCOSE BLD-MCNC: 137 MG/DL (ref 70–99)
GLUCOSE BLD-MCNC: 145 MG/DL (ref 70–99)
GLUCOSE BLD-MCNC: 145 MG/DL (ref 70–99)
GLUCOSE BLD-MCNC: 234 MG/DL (ref 70–99)
HCT VFR BLD CALC: 29 % (ref 42–52)
HEMOGLOBIN: 9 GM/DL (ref 13.5–18)
IMMATURE NEUTROPHIL %: 0.4 % (ref 0–0.43)
LYMPHOCYTES ABSOLUTE: 1 K/CU MM
LYMPHOCYTES RELATIVE PERCENT: 6.9 % (ref 24–44)
MAGNESIUM: 2 MG/DL (ref 1.8–2.4)
MCH RBC QN AUTO: 29.4 PG (ref 27–31)
MCHC RBC AUTO-ENTMCNC: 31 % (ref 32–36)
MCV RBC AUTO: 94.8 FL (ref 78–100)
MONOCYTES ABSOLUTE: 0.9 K/CU MM
MONOCYTES RELATIVE PERCENT: 6.8 % (ref 0–4)
NUCLEATED RBC %: 0 %
PDW BLD-RTO: 14.9 % (ref 11.7–14.9)
PHOSPHORUS: 2.8 MG/DL (ref 2.5–4.9)
PLATELET # BLD: 113 K/CU MM (ref 140–440)
PMV BLD AUTO: 10.8 FL (ref 7.5–11.1)
POTASSIUM SERPL-SCNC: 4.3 MMOL/L (ref 3.5–5.1)
RBC # BLD: 3.06 M/CU MM (ref 4.6–6.2)
SEGMENTED NEUTROPHILS ABSOLUTE COUNT: 11.7 K/CU MM
SEGMENTED NEUTROPHILS RELATIVE PERCENT: 84.8 % (ref 36–66)
SODIUM BLD-SCNC: 137 MMOL/L (ref 135–145)
TOTAL IMMATURE NEUTOROPHIL: 0.06 K/CU MM
TOTAL NUCLEATED RBC: 0 K/CU MM
VANCOMYCIN TROUGH: 8.8 UG/ML (ref 10–20)
WBC # BLD: 13.8 K/CU MM (ref 4–10.5)

## 2018-12-27 PROCEDURE — 82962 GLUCOSE BLOOD TEST: CPT

## 2018-12-27 PROCEDURE — 6370000000 HC RX 637 (ALT 250 FOR IP): Performed by: INTERNAL MEDICINE

## 2018-12-27 PROCEDURE — 73700 CT LOWER EXTREMITY W/O DYE: CPT

## 2018-12-27 PROCEDURE — 80202 ASSAY OF VANCOMYCIN: CPT

## 2018-12-27 PROCEDURE — 94660 CPAP INITIATION&MGMT: CPT

## 2018-12-27 PROCEDURE — 99211 OFF/OP EST MAY X REQ PHY/QHP: CPT

## 2018-12-27 PROCEDURE — 2060000000 HC ICU INTERMEDIATE R&B

## 2018-12-27 PROCEDURE — 83735 ASSAY OF MAGNESIUM: CPT

## 2018-12-27 PROCEDURE — 2580000003 HC RX 258: Performed by: INTERNAL MEDICINE

## 2018-12-27 PROCEDURE — 36415 COLL VENOUS BLD VENIPUNCTURE: CPT

## 2018-12-27 PROCEDURE — 97110 THERAPEUTIC EXERCISES: CPT

## 2018-12-27 PROCEDURE — 85025 COMPLETE CBC W/AUTO DIFF WBC: CPT

## 2018-12-27 PROCEDURE — 6360000002 HC RX W HCPCS: Performed by: INTERNAL MEDICINE

## 2018-12-27 PROCEDURE — 84100 ASSAY OF PHOSPHORUS: CPT

## 2018-12-27 PROCEDURE — 2700000000 HC OXYGEN THERAPY PER DAY

## 2018-12-27 PROCEDURE — 80048 BASIC METABOLIC PNL TOTAL CA: CPT

## 2018-12-27 PROCEDURE — 94761 N-INVAS EAR/PLS OXIMETRY MLT: CPT

## 2018-12-27 RX ADMIN — CLOPIDOGREL BISULFATE 75 MG: 75 TABLET ORAL at 08:52

## 2018-12-27 RX ADMIN — METRONIDAZOLE 500 MG: 250 TABLET ORAL at 06:54

## 2018-12-27 RX ADMIN — CARVEDILOL 25 MG: 25 TABLET, FILM COATED ORAL at 17:45

## 2018-12-27 RX ADMIN — PANTOPRAZOLE SODIUM 40 MG: 40 TABLET, DELAYED RELEASE ORAL at 06:54

## 2018-12-27 RX ADMIN — INSULIN LISPRO 11 UNITS: 100 INJECTION, SOLUTION INTRAVENOUS; SUBCUTANEOUS at 13:11

## 2018-12-27 RX ADMIN — SODIUM CHLORIDE: 9 INJECTION, SOLUTION INTRAVENOUS at 06:54

## 2018-12-27 RX ADMIN — METRONIDAZOLE 500 MG: 250 TABLET ORAL at 21:48

## 2018-12-27 RX ADMIN — VANCOMYCIN 2000 MG: 1 INJECTION, SOLUTION INTRAVENOUS at 17:48

## 2018-12-27 RX ADMIN — SODIUM CHLORIDE, PRESERVATIVE FREE 10 ML: 5 INJECTION INTRAVENOUS at 21:48

## 2018-12-27 RX ADMIN — INSULIN LISPRO 3 UNITS: 100 INJECTION, SOLUTION INTRAVENOUS; SUBCUTANEOUS at 13:11

## 2018-12-27 RX ADMIN — CEFEPIME HYDROCHLORIDE 2 G: 2 INJECTION, POWDER, FOR SOLUTION INTRAVENOUS at 14:01

## 2018-12-27 RX ADMIN — POTASSIUM CHLORIDE 10 MEQ: 750 TABLET, FILM COATED, EXTENDED RELEASE ORAL at 08:52

## 2018-12-27 RX ADMIN — RIVAROXABAN 15 MG: 15 TABLET, FILM COATED ORAL at 17:45

## 2018-12-27 RX ADMIN — CARVEDILOL 25 MG: 25 TABLET, FILM COATED ORAL at 08:52

## 2018-12-27 RX ADMIN — CEFEPIME HYDROCHLORIDE 2 G: 2 INJECTION, POWDER, FOR SOLUTION INTRAVENOUS at 02:58

## 2018-12-27 RX ADMIN — TAMSULOSIN HYDROCHLORIDE 0.4 MG: 0.4 CAPSULE ORAL at 08:52

## 2018-12-27 RX ADMIN — INSULIN GLARGINE 50 UNITS: 100 INJECTION, SOLUTION SUBCUTANEOUS at 21:54

## 2018-12-27 RX ADMIN — INSULIN LISPRO 3 UNITS: 100 INJECTION, SOLUTION INTRAVENOUS; SUBCUTANEOUS at 21:54

## 2018-12-27 RX ADMIN — SODIUM CHLORIDE, PRESERVATIVE FREE 10 ML: 5 INJECTION INTRAVENOUS at 08:52

## 2018-12-27 RX ADMIN — METRONIDAZOLE 500 MG: 250 TABLET ORAL at 14:01

## 2018-12-27 ASSESSMENT — PAIN DESCRIPTION - PAIN TYPE
TYPE: ACUTE PAIN
TYPE: ACUTE PAIN

## 2018-12-27 ASSESSMENT — PAIN DESCRIPTION - ONSET
ONSET: ON-GOING
ONSET: ON-GOING

## 2018-12-27 ASSESSMENT — PAIN DESCRIPTION - ORIENTATION
ORIENTATION: RIGHT
ORIENTATION: RIGHT

## 2018-12-27 ASSESSMENT — PAIN DESCRIPTION - LOCATION
LOCATION: LEG
LOCATION: LEG

## 2018-12-27 ASSESSMENT — PAIN DESCRIPTION - PROGRESSION
CLINICAL_PROGRESSION: GRADUALLY IMPROVING

## 2018-12-27 ASSESSMENT — PAIN DESCRIPTION - FREQUENCY
FREQUENCY: CONTINUOUS
FREQUENCY: CONTINUOUS

## 2018-12-27 ASSESSMENT — PAIN SCALES - GENERAL
PAINLEVEL_OUTOF10: 0
PAINLEVEL_OUTOF10: 4
PAINLEVEL_OUTOF10: 4

## 2018-12-27 NOTE — CONSULTS
Consults   Podiatry consultation dictated and recommend surgical consultation for the right calf cellulitis.

## 2018-12-27 NOTE — PROGRESS NOTES
normal.   Skin: Skin is warm.          Medications:   Medications:    cefepime  2 g Intravenous Q12H    metroNIDAZOLE  500 mg Oral 3 times per day    vancomycin (VANCOCIN) intermittent dosing (placeholder)   Other RX Placeholder    rivaroxaban  15 mg Oral Daily    sodium chloride flush  10 mL Intravenous 2 times per day    LORazepam  1 mg Intravenous See Admin Instructions    clopidogrel  75 mg Oral Daily    tamsulosin  0.4 mg Oral Daily    pantoprazole  40 mg Oral Daily    carvedilol  25 mg Oral BID WC    insulin glargine  50 Units Subcutaneous Nightly    insulin lispro  0-18 Units Subcutaneous TID WC    insulin lispro  0-9 Units Subcutaneous Nightly    insulin lispro  0.08 Units/kg Subcutaneous TID WC      Infusions:    dextrose       PRN Meds:     acetaminophen 650 mg Q4H PRN   sodium chloride flush 10 mL PRN   magnesium hydroxide 30 mL Daily PRN   ondansetron 4 mg Q6H PRN   zolpidem 5 mg Nightly PRN   nitroGLYCERIN 0.4 mg Q5 Min PRN   glucose 15 g PRN   dextrose 12.5 g PRN   glucagon (rDNA) 1 mg PRN   dextrose 100 mL/hr PRN         Electronically signed by Dae Lauren MD on 12/27/2018 at 9:45 AM

## 2018-12-27 NOTE — PROGRESS NOTES
sediment in urine- likely toxic and ischemic  Tubular injury- cholesterol emboli is a possibility   2. cellulitis- no osteo per MRI  3. Sever PAD/ DM / CAd along with multiple comorbid dz     Recommendation/Plan  :     1. Stop IVF   2. Add complement level in am   3. appropriate TX for infection  4.  Follow clinically       Lori eRndon MD

## 2018-12-27 NOTE — PROGRESS NOTES
Daily Progress Note     Pt is awake alert and feeling ok  HR and BP stable, NSR  Pt. States he has mild CP intermittently, SOB when off O2     Rt. Foot cellulitis    No Osteo on MRI   On Abx   Seen by podiatry-Rec: Gen surg consult     CAD s/p PCI    Last [facility] 7/18-stable    Elevated Trop likely d/t Renal insufficiency    Continue medical tx- Will watch for now     PAD s/p PTA    Pt. Had intervention on Lt. Leg 8/18 and Rt. On 11/18    Do not believe cellulitis d/t PAD- Will await Gen surg. rec    Continue Med. tx- if not healing will consider angio    Renal insuff. Renal following-cr 2.1     Will continue to follow     Peripheral angio-11/18  IMPRESSION:  Successful balloon angioplasty of the right anterior tibial  artery, lesioning from 100% to 10%.  There is a brisk flow noted. Vee Record  is a two-vessel runoff present to the foot, AT and peroneal artery. Posterior tibial artery is occluded and in the foot itself filling  through collateral circulation, AT and PT are filling.     Peripheral angio-8/18    IMPRESSION:  1.  Atherectomy followed by balloon PTA of the left AT, lesion decreased  from 100% down to 10%. 2.  Peroneal artery from 80% down to 10%, balloon angioplasty was  performed. 3.  Posterior tibial artery is 100% still occluded.     [facility]-7/18  IMPRESSION:  1.  Left main is patent. 2.  LAD has mild disease noted. 3.  Circ has mild disease, OM has mild disease. 4.  Ramus has ostial 70% stenosis noted. 5.  RCA proximal 50% to 70% stenosis present.  Mid stent is widely patent.    RPDA has a 90% stenosis noted.  It was a 2-mm vessel.  RPLV branch has mild  disease noted.  EDP was around 25 mmHg present.     PLAN:  From coronary artery disease stand, at this time, I will treat him  medically, optimize medical treatment.  The PD branch is a small vessel and  the proximal RCA is significantly calcified vessel, so we will try  optimized medical treatment, and if there are symptoms at that time, we  will personally, independently of the Physician assistant . I have reviewed the hospital care given to date and reviewed all pertinent labs and imaging. The plan was developed mutually at the time of the visit with the patient,  PA  and myself. I have spoken with patient, nursing staff and provided written and verbal instructions . The above note has been reviewed and I agree with the assessment, diagnosis, and treatment plan with changes made by me as follows     CARDIOLOGY ATTENDING ADDENDUM    HPI:  I have reviewed the above HPI  And agree with above   Lethia Duane is a 74 y. o.year old who and presents with had concerns including Chest Pain and Emesis. Chief Complaint   Patient presents with    Chest Pain    Emesis     Interval history:  Patient is awake doing ok  Right leg cellulitis noted  Known hx of CAD and PAD  Renal insuffiencey noted   No plan on angio at present treat cellulitis     CT leg  Impression:        Diffuse subcutaneous edema about the leg, especially to the mid to distal leg  which may relate to cellulitis.  No focal fluid collections or soft tissue  gas. No acute bony abnormalities.  No CT evidence for osteomyelitis. Partially imaged trace right knee joint effusion. Physical Exam:  General:  Awake, alert, NAD  Head:normal  Eye:normal  Neck:  No JVD   Chest:  Clear to auscultation, respiration easy  Cardiovascular:  RRR S1S2  Abdomen:   nontender  Extremities:  3+  edema  Pulses; palpable  Neuro: grossly normal      MEDICAL DECISION MAKING;    I agree with the above plan, which was planned by myself and discussed with PA. Anthony Hayes Electronically signed by Roddy Quintero MD [facility] on 12/27/2018 at 12:47 PM

## 2018-12-27 NOTE — PROGRESS NOTES
Physical Therapy    Physical Therapy Treatment Note  Name: Jolanta Arredondo MRN: [id number] :   1945   Date:  2018   Admission Date: 2018 Room:  -A   Restrictions/Precautions:        Osteomyelitis of right foot  Communication with other providers:  RN states pt is ok to see for therapy  Subjective:  Patient states:  He was up in the chair this morning and is pretty painful now  Pain:   Location, Type, Intensity (0/10 to 10/10):  6/10 R foot  Objective:    Observation:  Pt was resting in the bed  Treatment, including education/measures:  Supine Exercises: Ankle pumps x 10  Glute sets x 10  Heel slides x 2  Pt's pain increased and he wanted to defer further therapy  Therapeutic Exercise:  Therapeutic exercises were instructed today. Cues were given for technique, safety, recruitment, and rationale. Cues were verbal and/or tactile. Safety  Patient left safely in the bed, with call light/phone in reach with alarm applied. Gait belt was used for transfers and gait. Assessment / Impression:     Patient's tolerance of treatment:  Poor, d/t pain   Adverse Reaction: none  Significant change in status and impact:  none  Barriers to improvement:  pain  Plan for Next Session:    Will cont to work towards pt's goals per his tolerance  Time in:  1412  Time out:  1432  Timed treatment minutes:  20  Total treatment time:  20  Previously filed items:  Social/Functional History  Lives With: Daughter  Type of Home: House  Home Layout: One level  Home Access: Stairs to enter with rails  Entrance Stairs - Number of Steps: 3  Bathroom Shower/Tub: Tub/Shower unit  ADL Assistance: Independent  Homemaking Assistance: Needs assistance  Homemaking Responsibilities: No  Ambulation Assistance: Independent  Transfer Assistance: Independent  Active : No  Short term goals  Time Frame for Short term goals: 1 week  Short term goal 1: Patient will perform bed mobility mod I.   Short term goal 2: Patient will

## 2018-12-27 NOTE — CONSULTS
Dulaglutide (TRULICITY) 1.5 SF/7.4QN SOPN Inject into the skin once a week    Historical Provider, MD   potassium chloride (MICRO-K) 10 MEQ extended release capsule Take 10 mEq by mouth daily    Historical Provider, MD   rivaroxaban (XARELTO) 20 MG TABS tablet Take 20 mg by mouth    Historical Provider, MD   zolpidem (AMBIEN) 5 MG tablet Take 5 mg by mouth nightly as needed for Sleep. Cici Livingston Historical Provider, MD   pantoprazole (PROTONIX) 40 MG tablet Take 40 mg by mouth daily    Historical Provider, MD   tamsulosin (FLOMAX) 0.4 MG capsule Take 0.4 mg by mouth daily    Historical Provider, MD   busPIRone (BUSPAR) 5 MG tablet Take 5 mg by mouth 2 times daily as needed (Anxiety)     Historical Provider, MD   CPAP Machine MISC 14 cm by CPAP route nightly    Historical Provider, MD   furosemide (LASIX) 40 MG tablet Take 1 tablet by mouth 2 times daily  Patient taking differently: Take 40 mg by mouth daily  7/14/16   Jasmina Morfin MD   nitroGLYCERIN (NITROSTAT) 0.4 MG SL tablet Place 1 tablet under the tongue every 5 minutes as needed for Chest pain 4/20/16   Jasmina Morfin MD   clopidogrel (PLAVIX) 75 MG tablet Take 1 tablet by mouth daily 4/20/16   Jasmina Morfin MD   carvedilol (COREG) 25 MG tablet Take 1 tablet by mouth 2 times daily (with meals).  10/18/13   Jasmina Morfin MD     Allergies   Allergen Reactions    Amlodipine     Eliquis [Apixaban] Diarrhea    Norco [Hydrocodone-Acetaminophen] Nausea Only        Social History   Substance Use Topics    Smoking status: Never Smoker    Smokeless tobacco: Never Used    Alcohol use No      Family History   Problem Relation Age of Onset    Diabetes Mother     Heart Disease Mother     High Cholesterol Mother     Cancer Father     Heart Disease Father     High Cholesterol Father         Physical Exam:  BP (!) 167/63   Pulse 73   Temp 98 °F (36.7 °C) (Oral)   Resp 21   Ht 5' 8\" (1.727 m)   Wt (!) 322 lb 15.6 oz (146.5 kg)   SpO2 97%   BMI 49.11 kg/m²   Pt is awake, alert, oriented to person, place and time, appropriate with exam. Patient sitting in chair with feet on ground  HEENT:  Has non-icteric sclerae, no JVD  Right leg: edema, no weeping of leg, soft, warm, no ulcerations or skin breakdown, posterior calf red, anteriorly lower on leg with erythema. No areas of fluctuance  Right foot: toes with some scabs, no open wound, no drainage appreciated    Imaging:  CT Scan:  Yes, 12/27/2018  Right leg:  Impression   Diffuse subcutaneous edema about the leg, especially to the mid to distal leg   which may relate to cellulitis.  No focal fluid collections or soft tissue   gas.       No acute bony abnormalities.  No CT evidence for osteomyelitis.       Partially imaged trace right knee joint effusion.         MRI right foot: 12/26/2018  Impression   1. Nonspecific diffuse soft tissue edema which is most pronounced dorsally. Correlate clinically for cellulitis.  No organized drainable fluid collection   identified. 2. No evidence for osteomyelitis. 3. 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           Labs:  CBC:    Lab Results   Component Value Date    WBC 13.8 12/27/2018    HGB 9.0 12/27/2018    HCT 29.0 12/27/2018     12/27/2018     BMP:    Lab Results   Component Value Date     12/27/2018    K 4.3 12/27/2018    K 4.4 02/23/2018     12/27/2018    CO2 24 12/27/2018    BUN 42 12/27/2018    CREATININE 2.1 12/27/2018    CALCIUM 7.8 12/27/2018     PT/INR:    Lab Results   Component Value Date    PROTIME 11.5 11/13/2018    PROTIME 12.0 03/31/2011    INR 0.99 11/13/2018     PTT:    Lab Results   Component Value Date    APTT 28.9 11/13/2018     LFT:    Lab Results   Component Value Date    LABALBU 3.7 12/24/2018    BILITOT 0.3 12/24/2018    AST 17 12/24/2018    ALT 17 12/24/2018    ALKPHOS 65 12/24/2018       Assessment and Plan:  The patient presents with signs and symptoms consistent with lower extremity cellulitis.  No abscess of foot seen on MRI and no abscess of leg seen on CT scan. Would continue current management of elevation and antibiotics. No acute general surgery issues, available if needed.

## 2018-12-28 VITALS
RESPIRATION RATE: 23 BRPM | TEMPERATURE: 97.9 F | HEIGHT: 68 IN | SYSTOLIC BLOOD PRESSURE: 112 MMHG | WEIGHT: 315 LBS | DIASTOLIC BLOOD PRESSURE: 93 MMHG | BODY MASS INDEX: 47.74 KG/M2 | HEART RATE: 63 BPM | OXYGEN SATURATION: 97 %

## 2018-12-28 LAB
ALBUMIN SERPL-MCNC: 3.4 GM/DL (ref 3.4–5)
ANION GAP SERPL CALCULATED.3IONS-SCNC: 14 MMOL/L (ref 4–16)
BUN BLDV-MCNC: 45 MG/DL (ref 6–23)
CALCIUM SERPL-MCNC: 7.7 MG/DL (ref 8.3–10.6)
CHLORIDE BLD-SCNC: 100 MMOL/L (ref 99–110)
CO2: 22 MMOL/L (ref 21–32)
CREAT SERPL-MCNC: 1.8 MG/DL (ref 0.9–1.3)
GFR AFRICAN AMERICAN: 45 ML/MIN/1.73M2
GFR NON-AFRICAN AMERICAN: 37 ML/MIN/1.73M2
GLUCOSE BLD-MCNC: 186 MG/DL (ref 70–99)
GLUCOSE BLD-MCNC: 201 MG/DL (ref 70–99)
GLUCOSE BLD-MCNC: 238 MG/DL (ref 70–99)
GLUCOSE BLD-MCNC: 270 MG/DL (ref 70–99)
HCT VFR BLD CALC: 29.1 % (ref 42–52)
HEMOGLOBIN: 8.9 GM/DL (ref 13.5–18)
MCH RBC QN AUTO: 29.6 PG (ref 27–31)
MCHC RBC AUTO-ENTMCNC: 30.6 % (ref 32–36)
MCV RBC AUTO: 96.7 FL (ref 78–100)
PDW BLD-RTO: 14.9 % (ref 11.7–14.9)
PHOSPHORUS: 2.7 MG/DL (ref 2.5–4.9)
PLATELET # BLD: 99 K/CU MM (ref 140–440)
PMV BLD AUTO: 10.3 FL (ref 7.5–11.1)
POTASSIUM SERPL-SCNC: 4.2 MMOL/L (ref 3.5–5.1)
RBC # BLD: 3.01 M/CU MM (ref 4.6–6.2)
SODIUM BLD-SCNC: 136 MMOL/L (ref 135–145)
VANCOMYCIN TROUGH: 14.4 UG/ML (ref 10–20)
WBC # BLD: 10.5 K/CU MM (ref 4–10.5)

## 2018-12-28 PROCEDURE — 2580000003 HC RX 258: Performed by: INTERNAL MEDICINE

## 2018-12-28 PROCEDURE — 80069 RENAL FUNCTION PANEL: CPT

## 2018-12-28 PROCEDURE — 6370000000 HC RX 637 (ALT 250 FOR IP): Performed by: INTERNAL MEDICINE

## 2018-12-28 PROCEDURE — 6360000002 HC RX W HCPCS: Performed by: INTERNAL MEDICINE

## 2018-12-28 PROCEDURE — 85027 COMPLETE CBC AUTOMATED: CPT

## 2018-12-28 PROCEDURE — 80202 ASSAY OF VANCOMYCIN: CPT

## 2018-12-28 PROCEDURE — 97530 THERAPEUTIC ACTIVITIES: CPT

## 2018-12-28 PROCEDURE — 36415 COLL VENOUS BLD VENIPUNCTURE: CPT

## 2018-12-28 PROCEDURE — 97116 GAIT TRAINING THERAPY: CPT

## 2018-12-28 PROCEDURE — 82962 GLUCOSE BLOOD TEST: CPT

## 2018-12-28 PROCEDURE — 94660 CPAP INITIATION&MGMT: CPT

## 2018-12-28 PROCEDURE — 94680 O2 UPTK RST&XERS DIR SIMPLE: CPT

## 2018-12-28 RX ORDER — CLINDAMYCIN HYDROCHLORIDE 150 MG/1
300 CAPSULE ORAL EVERY 6 HOURS SCHEDULED
Status: DISCONTINUED | OUTPATIENT
Start: 2018-12-28 | End: 2018-12-28 | Stop reason: HOSPADM

## 2018-12-28 RX ORDER — CIPROFLOXACIN 500 MG/1
500 TABLET, FILM COATED ORAL EVERY 12 HOURS SCHEDULED
Qty: 20 TABLET | Refills: 0 | Status: SHIPPED | OUTPATIENT
Start: 2018-12-28 | End: 2019-01-01

## 2018-12-28 RX ORDER — CLINDAMYCIN HYDROCHLORIDE 150 MG/1
150 CAPSULE ORAL EVERY 6 HOURS SCHEDULED
Status: DISCONTINUED | OUTPATIENT
Start: 2018-12-28 | End: 2018-12-28

## 2018-12-28 RX ORDER — CLINDAMYCIN HYDROCHLORIDE 300 MG/1
300 CAPSULE ORAL 3 TIMES DAILY
Qty: 30 CAPSULE | Refills: 0 | Status: SHIPPED | OUTPATIENT
Start: 2018-12-28 | End: 2019-01-01

## 2018-12-28 RX ORDER — FUROSEMIDE 40 MG/1
40 TABLET ORAL 2 TIMES DAILY
Status: DISCONTINUED | OUTPATIENT
Start: 2018-12-28 | End: 2018-12-28 | Stop reason: HOSPADM

## 2018-12-28 RX ORDER — CLINDAMYCIN HYDROCHLORIDE 300 MG/1
300 CAPSULE ORAL 3 TIMES DAILY
Qty: 30 CAPSULE | Refills: 0 | Status: SHIPPED | OUTPATIENT
Start: 2018-12-28 | End: 2018-12-28

## 2018-12-28 RX ORDER — INSULIN GLARGINE 100 [IU]/ML
50 INJECTION, SOLUTION SUBCUTANEOUS NIGHTLY
Status: DISCONTINUED | OUTPATIENT
Start: 2018-12-28 | End: 2018-12-28 | Stop reason: HOSPADM

## 2018-12-28 RX ORDER — CIPROFLOXACIN 500 MG/1
500 TABLET, FILM COATED ORAL EVERY 12 HOURS SCHEDULED
Status: DISCONTINUED | OUTPATIENT
Start: 2018-12-28 | End: 2018-12-28 | Stop reason: HOSPADM

## 2018-12-28 RX ORDER — CIPROFLOXACIN 500 MG/1
500 TABLET, FILM COATED ORAL EVERY 12 HOURS SCHEDULED
Qty: 20 TABLET | Refills: 0 | Status: SHIPPED | OUTPATIENT
Start: 2018-12-28 | End: 2018-12-28

## 2018-12-28 RX ADMIN — TAMSULOSIN HYDROCHLORIDE 0.4 MG: 0.4 CAPSULE ORAL at 09:19

## 2018-12-28 RX ADMIN — INSULIN LISPRO 3 UNITS: 100 INJECTION, SOLUTION INTRAVENOUS; SUBCUTANEOUS at 14:02

## 2018-12-28 RX ADMIN — SODIUM CHLORIDE, PRESERVATIVE FREE 10 ML: 5 INJECTION INTRAVENOUS at 14:05

## 2018-12-28 RX ADMIN — PANTOPRAZOLE SODIUM 40 MG: 40 TABLET, DELAYED RELEASE ORAL at 05:56

## 2018-12-28 RX ADMIN — INSULIN LISPRO 10 UNITS: 100 INJECTION, SOLUTION INTRAVENOUS; SUBCUTANEOUS at 14:02

## 2018-12-28 RX ADMIN — CLOPIDOGREL BISULFATE 75 MG: 75 TABLET ORAL at 09:19

## 2018-12-28 RX ADMIN — INSULIN LISPRO 10 UNITS: 100 INJECTION, SOLUTION INTRAVENOUS; SUBCUTANEOUS at 09:23

## 2018-12-28 RX ADMIN — INSULIN LISPRO 9 UNITS: 100 INJECTION, SOLUTION INTRAVENOUS; SUBCUTANEOUS at 09:23

## 2018-12-28 RX ADMIN — METRONIDAZOLE 500 MG: 250 TABLET ORAL at 05:56

## 2018-12-28 RX ADMIN — CEFEPIME HYDROCHLORIDE 2 G: 2 INJECTION, POWDER, FOR SOLUTION INTRAVENOUS at 02:14

## 2018-12-28 ASSESSMENT — PAIN SCALES - GENERAL
PAINLEVEL_OUTOF10: 0

## 2018-12-28 NOTE — PROGRESS NOTES
Pt and daughter state understanding of all discharge paperwork. Pt and daughter state understanding of importance of making and keep all follow up appointments. Pt and daughter state understanding of how to use home oxygen. Pt and daughter aware of home health being started.

## 2018-12-28 NOTE — PROGRESS NOTES
Patient is on home treatment regimen. No further assessment needed unless patient condition changes. 12/28/2018 1:53 PM  Patient Room #: 2025/2025-A  Patient Name: Luisa Chambers    (Step 1 Done by RN if possible otherwise call Pulmonary Diagnostics)  1. Place patient on room air at rest for at least 30 minutes. If patient falls below 88% before 30 minutes then you can record the level and stop. Record room air saturation level ____ %. If patient is at 88% or below, they will qualify for home oxygen and you can stop. If level does not fall below 88%, fill in level above. If indicated continue to Step 2. Signature:_______________________ Date: ____________  (Step 2&3 Done by RCP)  2. Ambulate patient on room air until saturation falls below 89%. Record level of room air saturation with ambulation____ %. Next, place patient back on ______lpm oxygen and ambulate, record level _____%. (Note:  this level must show improvement from room air level done with ambulation.)  If patients saturation on room air with ambulation is 88% or below AND patient shows improvement with oxygen during ambulation, they will qualify for home oxygen and you can stop. If patient does not drop below 89%, then patient should have an overnight oximetry trending on room air to see if level falls below 88%. Complete level in Step 3 below. 3. Room air overnight oximetry level 88 % for__________  cumulative minutes. If patients room air oxygen level is < 89% for at least 5 cumulative minutes, patient will qualify for home oxygen and you can stop. (Attach Night Trending Report)    Complete order below: Diagnosis:_____CAD_________________________________  Home oxygen at:  Length of Need: ? Lifetime ?  3 Months     _____lpm or _____%   via  [] nasal cannula  []mask  [] other:________________         []continuous []  with activity  []  Nocturnal   [] Portable Tanks []  Concentrator        Therapist Signature:

## 2018-12-28 NOTE — PLAN OF CARE
Problem: Falls - Risk of:  Goal: Will remain free from falls  Will remain free from falls   Outcome: Ongoing    Goal: Absence of physical injury  Absence of physical injury   Outcome: Ongoing      Problem: Risk for Impaired Skin Integrity  Goal: Tissue integrity - skin and mucous membranes  Structural intactness and normal physiological function of skin and  mucous membranes.    Outcome: Ongoing      Problem: Infection:  Goal: Will remain free from infection  Will remain free from infection   Outcome: Ongoing      Problem: Safety:  Goal: Free from accidental physical injury  Free from accidental physical injury   Outcome: Ongoing      Problem: Daily Care:  Goal: Daily care needs are met  Daily care needs are met   Outcome: Ongoing      Problem: Pain:  Goal: Patient's pain/discomfort is manageable  Patient's pain/discomfort is manageable   Outcome: Ongoing    Goal: Pain level will decrease  Pain level will decrease   Outcome: Ongoing    Goal: Control of acute pain  Control of acute pain   Outcome: Ongoing    Goal: Control of chronic pain  Control of chronic pain   Outcome: Ongoing      Problem: Skin Integrity:  Goal: Skin integrity will stabilize  Skin integrity will stabilize   Outcome: Ongoing      Problem: Discharge Planning:  Goal: Patients continuum of care needs are met  Patients continuum of care needs are met   Outcome: Ongoing

## 2018-12-28 NOTE — CONSULTS
Pulse 64   Temp 97.4 °F (36.3 °C) (Oral)   Resp (!) 33   Ht 5' 8\" (1.727 m)   Wt (!) 328 lb 11.3 oz (149.1 kg)   SpO2 99%   BMI 49.98 kg/m²     CONSTITUTIONAL:  awake, alert, cooperative, appears stated age   EYES:  vision intact Fundoscopic Exam not performed   ENT:Normal  NECK:  Supple, No JVD. Thyroid Exam:Normal   LUNGS:  Has Vesicular Breath Sounds,   CARDIOVASCULAR:  Normal apical impulse, regular rate and rhythm, normal S1 and S2, no S3 or S4, and has no  murmur   ABDOMEN:  No scars, normal bowel sounds, soft, non-distended, non-tender, no masses palpated, no hepatolienomegaly  Musculoskeletal: Normal  Extremities: Normal, peripheral pulses normal, , has no edema cellulitis of right second toe and left fourth toe  NEUROLOGIC:  Awake, alert, oriented to name, place and time. Cranial nerves II-XII are grossly intact. Motor is  intact. Sensory is neuropathy,  and gait is abnormal.    DATA:    CBC:   Recent Labs      12/27/18   0105   WBC  13.8*   HGB  9.0*   PLT  113*    CMP:  Recent Labs      12/26/18   0252  12/27/18   0105   NA  138  137   K  4.0  4.3   CL  99  102   CO2  24  24   BUN  39*  42*   CREATININE  1.9*  2.1*   CALCIUM  8.1*  7.8*     Lipids:   Lab Results   Component Value Date    CHOL 241 02/25/2018    HDL 42 02/25/2018    TRIG 278 02/25/2018     Glucose:   Recent Labs      12/27/18   1223  12/27/18   1740  12/27/18   2152   POCGLU  145*  137*  234*     Hemoglobin A1C:   Lab Results   Component Value Date    LABA1C 7.0 12/25/2018     Free T4:   Lab Results   Component Value Date    T4FREE 0.89 02/25/2018     Free T3:   Lab Results   Component Value Date    FT3 1.5 02/25/2018     TSH High Sensitivity:   Lab Results   Component Value Date    TSHHS 0.480 02/23/2018       Xr Foot Left (min 3 Views)    Result Date: 12/24/2018  EXAMINATION: 4 XRAY VIEWS OF THE LEFT FOOT 12/24/2018 5:11 pm COMPARISON: None.  HISTORY: ORDERING SYSTEM PROVIDED HISTORY: 2nd toe discoloration and swelling, h/o PAD and diabetes TECHNOLOGIST PROVIDED HISTORY: Reason for exam:->2nd toe discoloration and swelling, h/o PAD and diabetes Ordering Physician Provided Reason for Exam: left foot pain Acuity: Unknown Type of Exam: Unknown Relevant Medical/Surgical History: diabetes FINDINGS: The bony structures, soft tissues and joints are without acute findings throughout. No fracture demonstrated, and no dislocation. No evidence of osteomyelitis     No acute abnormality of the left foot. No evidence of osteomyelitis or fracture. Xr Foot Right (min 3 Views)    Result Date: 12/24/2018  EXAMINATION: 3 XRAY VIEWS OF THE RIGHT FOOT 12/24/2018 5:11 pm COMPARISON: Left foot 12/24/2018 HISTORY: ORDERING SYSTEM PROVIDED HISTORY: 4th toe discoloration, h/o PAD and diabetes TECHNOLOGIST PROVIDED HISTORY: Reason for exam:->4th toe discoloration, h/o PAD and diabetes Ordering Physician Provided Reason for Exam: right foot pain Acuity: Chronic Type of Exam: Ongoing Relevant Medical/Surgical History: diabetes FINDINGS: There appears to be mild erosive change of the proximal base of 4th and 5th proximal phalanges. There is question of old fracture of proximal 4th phalanx. Mild osteoarthritic changes of the 1st MTP joint. Calcaneal spur. Enthesophyte formation at the Achilles insertion. Mild erosive changes along the proximal base of the 4th and 5th proximal phalanges is questionable for early osteomyelitis given the history. Other etiologies are not excluded. There may be prior fracture of the 4th proximal phalanx.   MRI of the right forefoot without and with contrast may be beneficial.     Xr Chest Portable    Result Date: 12/24/2018  EXAMINATION: SINGLE XRAY VIEW OF THE CHEST 12/24/2018 4:47 pm COMPARISON: Chest x-ray 02/23/2018 HISTORY: ORDERING SYSTEM PROVIDED HISTORY: chest pain TECHNOLOGIST PROVIDED HISTORY: Reason for exam:->chest pain Ordering Physician Provided Reason for Exam: chest pain Acuity: Acute Type of Exam: Initial Additional signs and symptoms: na Relevant Medical/Surgical History: diabetes, cad, hypertension FINDINGS: The lungs are clear. Costophrenic angles are clear. Cardiac and mediastinal structures are unchanged. Osseous structures are unchanged. Stable appearance of the chest without evidence of acute cardiopulmonary disease. Ct Tibia Fibula Right Wo Contrast    Result Date: 12/27/2018  EXAMINATION: CT OF THE RIGHT TIBIA AND FIBULA WITHOUT CONTRAST 12/27/2018 10:45 am TECHNIQUE: CT of the right tibia and fibula was performed without the administration of intravenous contrast.  Multiplanar reformatted images are provided for review. Dose modulation, iterative reconstruction, and/or weight based adjustment of the mA/kV was utilized to reduce the radiation dose to as low as reasonably achievable. COMPARISON: None. HISTORY ORDERING SYSTEM PROVIDED HISTORY: cellulitis, rule out abscess TECHNOLOGIST PROVIDED HISTORY: Ordering Physician Provided Reason for Exam: cellulitis, rule out abscess Acuity: Unknown Type of Exam: Unknown Additional signs and symptoms: cellulitis, rule out abscess Relevant Medical/Surgical History: pt told multiple times to hold still FINDINGS: Bones: No acute fractures or dislocations. Well corticated ossification adjacent to inferior aspect of medial malleolus likely on the basis of prior remote injury. No suspicious focal bony lesions. No bony erosions or bony destructive changes. No periosteal reaction. Soft Tissue:  Diffuse subcutaneous edema especially to the mid to distal leg. No focal fluid collections, radiopaque foreign bodies or soft tissue gas. Atherosclerotic vascular calcifications. To the extent they can be evaluated the visualized tendons appear grossly intact and appropriately located. Enthesopathy off the calcaneus at Achilles tendon attachment. Joints: Partially imaged right knee joint appears to be maintained without significant degenerative changes noted.   There is a trace knee joint effusion. Visualized right foot and ankle joints appear to be maintained without significant degenerative changes noted. No joint effusions or intra-articular loose bodies are seen. No evidence for an osteochondral lesion to the tibiotalar joint. Diffuse subcutaneous edema about the leg, especially to the mid to distal leg which may relate to cellulitis. No focal fluid collections or soft tissue gas. No acute bony abnormalities. No CT evidence for osteomyelitis. Partially imaged trace right knee joint effusion. Vl Dup Lower Extremity Arteries Bilateral    Result Date: 12/24/2018  EXAMINATION: ARTERIAL DUPLEX ULTRASOUND OF THE BILATERAL LOWER EXTREMITIES  12/24/2018 5:23 pm TECHNIQUE: Duplex ultrasound and Doppler images were obtained of the bilateral lower extremities COMPARISON: 04/18/2016 HISTORY: ORDERING SYSTEM PROVIDED HISTORY: history of PAD, discoloration of the right 4th toe and left 2nd toe TECHNOLOGIST PROVIDED HISTORY: Reason for exam:->history of PAD, discoloration of the right 4th toe and left 2nd toe Ordering Physician Provided Reason for Exam: hx PAD discoloration of right 4th toe and left 2nd toe Acuity: Acute Type of Exam: Initial Relevant Medical/Surgical History: previous US 4/18/16 FINDINGS: Grayscale imaging demonstrates atherosclerotic plaque in the proximal femoral circulation. Triphasic waveforms are noted within the common femoral, SFA and popliteal arteries bilaterally. There is no evidence of significant flow limiting stenosis. Again noted is evidence of runoff disease with nonvisualization of the right peroneal, distal right RONALD and mid to distal left PTA. Right: Flow velocities were measured as follows: Com. Fem. 90 cm/sec Prof.            64 cm/sec SFA Prox. 110 cm/sec SFA Mid.      93 cm/sec SFA Dist.      73 cm/sec Pop.             63 cm/sec PTA             17, 26 and 22 cm/sec Peron.           Not visualized cm/sec RONALD             67, 14 and not

## 2018-12-28 NOTE — PROGRESS NOTES
comorbid dz- including Dm    Recommendation/Plan  :     1. Ok   to d/C   2. Low salt  3. Daily wt  4. Call me with wt gain > 4 kg  5. Good glycemia control  6. 'resume lasix 40 BID  7. BMP , mg in 1-2 wk's  8. F/u with me in 2-3 wk's   9.        Everette Lambert MD

## 2018-12-30 LAB
CULTURE: NORMAL
Lab: NORMAL
ORGANISM: NORMAL
REPORT STATUS: NORMAL
SPECIMEN: NORMAL

## 2018-12-31 LAB
CULTURE: NORMAL
Lab: NORMAL
REPORT STATUS: NORMAL
SPECIMEN: NORMAL

## 2019-01-01 ENCOUNTER — TELEPHONE (OUTPATIENT)
Dept: FAMILY MEDICINE CLINIC | Age: 74
End: 2019-01-01

## 2019-01-01 ENCOUNTER — OFFICE VISIT (OUTPATIENT)
Dept: FAMILY MEDICINE CLINIC | Age: 74
End: 2019-01-01
Payer: MEDICARE

## 2019-01-01 VITALS
OXYGEN SATURATION: 98 % | HEIGHT: 69 IN | BODY MASS INDEX: 46.65 KG/M2 | SYSTOLIC BLOOD PRESSURE: 112 MMHG | HEART RATE: 69 BPM | WEIGHT: 315 LBS | DIASTOLIC BLOOD PRESSURE: 62 MMHG

## 2019-01-01 VITALS
SYSTOLIC BLOOD PRESSURE: 128 MMHG | DIASTOLIC BLOOD PRESSURE: 76 MMHG | HEART RATE: 80 BPM | WEIGHT: 315 LBS | HEIGHT: 69 IN | BODY MASS INDEX: 46.65 KG/M2

## 2019-01-01 DIAGNOSIS — E11.9 TYPE 2 DIABETES MELLITUS WITHOUT COMPLICATION, WITH LONG-TERM CURRENT USE OF INSULIN (HCC): ICD-10-CM

## 2019-01-01 DIAGNOSIS — E10.9 TYPE 1 DIABETES MELLITUS WITHOUT COMPLICATION (HCC): ICD-10-CM

## 2019-01-01 DIAGNOSIS — Z79.4 TYPE 2 DIABETES MELLITUS WITHOUT COMPLICATION, WITH LONG-TERM CURRENT USE OF INSULIN (HCC): ICD-10-CM

## 2019-01-01 DIAGNOSIS — K57.30 DIVERTICULOSIS OF COLON WITHOUT DIVERTICULITIS: ICD-10-CM

## 2019-01-01 DIAGNOSIS — Z79.4 TYPE 2 DIABETES MELLITUS WITHOUT COMPLICATION, WITH LONG-TERM CURRENT USE OF INSULIN (HCC): Primary | ICD-10-CM

## 2019-01-01 DIAGNOSIS — Z98.61 H/O PERCUTANEOUS TRANSLUMINAL CORONARY ANGIOPLASTY: ICD-10-CM

## 2019-01-01 DIAGNOSIS — K21.9 GASTROESOPHAGEAL REFLUX DISEASE, ESOPHAGITIS PRESENCE NOT SPECIFIED: Primary | ICD-10-CM

## 2019-01-01 DIAGNOSIS — E11.42 DIABETIC PERIPHERAL NEUROPATHY (HCC): ICD-10-CM

## 2019-01-01 DIAGNOSIS — E78.2 MIXED HYPERLIPIDEMIA: ICD-10-CM

## 2019-01-01 DIAGNOSIS — Z86.718 H/O DEEP VENOUS THROMBOSIS: Primary | ICD-10-CM

## 2019-01-01 DIAGNOSIS — G47.33 OSA (OBSTRUCTIVE SLEEP APNEA): ICD-10-CM

## 2019-01-01 DIAGNOSIS — E11.9 TYPE 2 DIABETES MELLITUS WITHOUT COMPLICATION, WITH LONG-TERM CURRENT USE OF INSULIN (HCC): Primary | ICD-10-CM

## 2019-01-01 DIAGNOSIS — Z86.718 H/O DEEP VENOUS THROMBOSIS: ICD-10-CM

## 2019-01-01 DIAGNOSIS — Z86.73 H/O: CVA (CEREBROVASCULAR ACCIDENT): ICD-10-CM

## 2019-01-01 DIAGNOSIS — I10 ESSENTIAL HYPERTENSION: ICD-10-CM

## 2019-01-01 DIAGNOSIS — G47.33 OBSTRUCTIVE SLEEP APNEA: ICD-10-CM

## 2019-01-01 LAB
A/G RATIO: 2 (ref 1.1–2.2)
ALBUMIN SERPL-MCNC: 4.3 G/DL (ref 3.4–5)
ALP BLD-CCNC: 67 U/L (ref 40–129)
ALT SERPL-CCNC: 20 U/L (ref 10–40)
ANION GAP SERPL CALCULATED.3IONS-SCNC: 17 MMOL/L (ref 3–16)
AST SERPL-CCNC: 18 U/L (ref 15–37)
BILIRUB SERPL-MCNC: <0.2 MG/DL (ref 0–1)
BUN BLDV-MCNC: 44 MG/DL (ref 7–20)
CALCIUM SERPL-MCNC: 8.8 MG/DL (ref 8.3–10.6)
CHLORIDE BLD-SCNC: 103 MMOL/L (ref 99–110)
CHOLESTEROL, TOTAL: 239 MG/DL (ref 0–199)
CO2: 24 MMOL/L (ref 21–32)
CREAT SERPL-MCNC: 1.5 MG/DL (ref 0.8–1.3)
ESTIMATED AVERAGE GLUCOSE: 148.5 MG/DL
GFR AFRICAN AMERICAN: 55
GFR NON-AFRICAN AMERICAN: 45
GLOBULIN: 2.1 G/DL
GLUCOSE BLD-MCNC: 163 MG/DL (ref 70–99)
HBA1C MFR BLD: 6.8 %
HDLC SERPL-MCNC: 34 MG/DL (ref 40–60)
LDL CHOLESTEROL CALCULATED: ABNORMAL MG/DL
LDL CHOLESTEROL DIRECT: 163 MG/DL
POTASSIUM SERPL-SCNC: 4.4 MMOL/L (ref 3.5–5.1)
SODIUM BLD-SCNC: 144 MMOL/L (ref 136–145)
TOTAL PROTEIN: 6.4 G/DL (ref 6.4–8.2)
TRIGL SERPL-MCNC: 321 MG/DL (ref 0–150)
VLDLC SERPL CALC-MCNC: ABNORMAL MG/DL

## 2019-01-01 PROCEDURE — 99214 OFFICE O/P EST MOD 30 MIN: CPT | Performed by: FAMILY MEDICINE

## 2019-01-01 PROCEDURE — G8427 DOCREV CUR MEDS BY ELIG CLIN: HCPCS | Performed by: FAMILY MEDICINE

## 2019-01-01 PROCEDURE — G8598 ASA/ANTIPLAT THER USED: HCPCS | Performed by: FAMILY MEDICINE

## 2019-01-01 PROCEDURE — 99213 OFFICE O/P EST LOW 20 MIN: CPT | Performed by: FAMILY MEDICINE

## 2019-01-01 PROCEDURE — 1123F ACP DISCUSS/DSCN MKR DOCD: CPT | Performed by: FAMILY MEDICINE

## 2019-01-01 PROCEDURE — 1036F TOBACCO NON-USER: CPT | Performed by: FAMILY MEDICINE

## 2019-01-01 PROCEDURE — G8417 CALC BMI ABV UP PARAM F/U: HCPCS | Performed by: FAMILY MEDICINE

## 2019-01-01 PROCEDURE — G8484 FLU IMMUNIZE NO ADMIN: HCPCS | Performed by: FAMILY MEDICINE

## 2019-01-01 PROCEDURE — 4040F PNEUMOC VAC/ADMIN/RCVD: CPT | Performed by: FAMILY MEDICINE

## 2019-01-01 RX ORDER — CARVEDILOL 25 MG/1
25 TABLET ORAL 2 TIMES DAILY WITH MEALS
Qty: 60 TABLET | Refills: 5 | Status: SHIPPED | OUTPATIENT
Start: 2019-01-01

## 2019-01-01 RX ORDER — PANTOPRAZOLE SODIUM 40 MG/1
TABLET, DELAYED RELEASE ORAL
Qty: 30 TABLET | Refills: 2 | Status: SHIPPED | OUTPATIENT
Start: 2019-01-01 | End: 2019-01-01 | Stop reason: SDUPTHER

## 2019-01-01 RX ORDER — PANTOPRAZOLE SODIUM 40 MG/1
40 TABLET, DELAYED RELEASE ORAL
Qty: 30 TABLET | Refills: 3 | Status: SHIPPED | OUTPATIENT
Start: 2019-01-01 | End: 2019-01-01 | Stop reason: SDUPTHER

## 2019-01-01 RX ORDER — TAMSULOSIN HYDROCHLORIDE 0.4 MG/1
0.4 CAPSULE ORAL DAILY
Qty: 30 CAPSULE | Refills: 2 | Status: SHIPPED | OUTPATIENT
Start: 2019-01-01 | End: 2019-01-01 | Stop reason: SDUPTHER

## 2019-01-01 RX ORDER — TAMSULOSIN HYDROCHLORIDE 0.4 MG/1
0.4 CAPSULE ORAL DAILY
Qty: 30 CAPSULE | Refills: 3 | Status: SHIPPED | OUTPATIENT
Start: 2019-01-01 | End: 2019-01-01 | Stop reason: SDUPTHER

## 2019-01-01 RX ORDER — TAMSULOSIN HYDROCHLORIDE 0.4 MG/1
CAPSULE ORAL
Qty: 30 CAPSULE | Refills: 2 | Status: SHIPPED | OUTPATIENT
Start: 2019-01-01 | End: 2019-01-01 | Stop reason: SDUPTHER

## 2019-01-01 RX ORDER — PANTOPRAZOLE SODIUM 40 MG/1
40 TABLET, DELAYED RELEASE ORAL DAILY
Qty: 30 TABLET | Refills: 2 | Status: SHIPPED | OUTPATIENT
Start: 2019-01-01 | End: 2019-01-01 | Stop reason: SDUPTHER

## 2019-01-01 RX ORDER — UBIQUINOL 100 MG
CAPSULE ORAL
COMMUNITY

## 2019-01-01 RX ORDER — GLIMEPIRIDE 1 MG/1
1 TABLET ORAL DAILY
Qty: 30 TABLET | Refills: 5 | Status: SHIPPED | OUTPATIENT
Start: 2019-01-01

## 2019-01-01 RX ORDER — PANTOPRAZOLE SODIUM 40 MG/1
40 TABLET, DELAYED RELEASE ORAL
Qty: 90 TABLET | Refills: 0 | Status: SHIPPED | OUTPATIENT
Start: 2019-01-01 | End: 2019-01-01 | Stop reason: SDUPTHER

## 2019-01-01 RX ORDER — CARVEDILOL 25 MG/1
25 TABLET ORAL 2 TIMES DAILY WITH MEALS
Qty: 60 TABLET | Refills: 5 | Status: SHIPPED | OUTPATIENT
Start: 2019-01-01 | End: 2019-01-01 | Stop reason: SDUPTHER

## 2019-01-01 RX ORDER — PEN NEEDLE, DIABETIC 31 GX5/16"
NEEDLE, DISPOSABLE MISCELLANEOUS
COMMUNITY
End: 2019-01-01

## 2019-01-01 RX ORDER — DULAGLUTIDE 1.5 MG/.5ML
INJECTION, SOLUTION SUBCUTANEOUS
Qty: 2 ML | Refills: 2 | Status: SHIPPED | OUTPATIENT
Start: 2019-01-01

## 2019-01-01 RX ORDER — CHOLECALCIFEROL (VITAMIN D3) 125 MCG
5 CAPSULE ORAL NIGHTLY
COMMUNITY

## 2019-01-01 RX ORDER — GLIMEPIRIDE 1 MG/1
1 TABLET ORAL DAILY
Qty: 30 TABLET | Refills: 2 | Status: SHIPPED | OUTPATIENT
Start: 2019-01-01 | End: 2019-01-01 | Stop reason: SDUPTHER

## 2019-01-01 RX ORDER — INSULIN DEGLUDEC 200 U/ML
INJECTION, SOLUTION SUBCUTANEOUS
Qty: 15 ML | Refills: 2 | Status: SHIPPED | OUTPATIENT
Start: 2019-01-01

## 2019-01-01 RX ORDER — GLIMEPIRIDE 1 MG/1
1 TABLET ORAL DAILY
Qty: 30 TABLET | Refills: 5 | Status: SHIPPED | OUTPATIENT
Start: 2019-01-01 | End: 2019-01-01 | Stop reason: SDUPTHER

## 2019-01-01 RX ORDER — TAMSULOSIN HYDROCHLORIDE 0.4 MG/1
0.4 CAPSULE ORAL DAILY
Qty: 90 CAPSULE | Refills: 0 | Status: SHIPPED | OUTPATIENT
Start: 2019-01-01 | End: 2019-01-01 | Stop reason: SDUPTHER

## 2019-01-01 RX ORDER — CLINDAMYCIN HYDROCHLORIDE 300 MG/1
300 CAPSULE ORAL 3 TIMES DAILY
COMMUNITY
End: 2019-01-01 | Stop reason: ALTCHOICE

## 2019-01-01 RX ORDER — GLIMEPIRIDE 1 MG/1
TABLET ORAL
Qty: 30 TABLET | Refills: 2 | Status: SHIPPED | OUTPATIENT
Start: 2019-01-01 | End: 2019-01-01 | Stop reason: SDUPTHER

## 2019-01-01 RX ORDER — CARVEDILOL 25 MG/1
25 TABLET ORAL 2 TIMES DAILY WITH MEALS
Qty: 60 TABLET | Refills: 0 | Status: SHIPPED | OUTPATIENT
Start: 2019-01-01 | End: 2019-01-01 | Stop reason: SDUPTHER

## 2019-01-01 ASSESSMENT — PATIENT HEALTH QUESTIONNAIRE - PHQ9
1. LITTLE INTEREST OR PLEASURE IN DOING THINGS: 0
SUM OF ALL RESPONSES TO PHQ QUESTIONS 1-9: 0
SUM OF ALL RESPONSES TO PHQ QUESTIONS 1-9: 0
2. FEELING DOWN, DEPRESSED OR HOPELESS: 0
SUM OF ALL RESPONSES TO PHQ9 QUESTIONS 1 & 2: 0

## 2019-01-01 ASSESSMENT — ENCOUNTER SYMPTOMS
RESPIRATORY NEGATIVE: 1
SHORTNESS OF BREATH: 0
ABDOMINAL DISTENTION: 0
DIARRHEA: 0
GASTROINTESTINAL NEGATIVE: 1
CONSTIPATION: 0
CHEST TIGHTNESS: 0
COUGH: 0
NAUSEA: 0
ABDOMINAL PAIN: 0
VOMITING: 0
SHORTNESS OF BREATH: 0

## 2019-05-02 NOTE — TELEPHONE ENCOUNTER
----- Message from Emmanuel Martinez sent at 5/1/2019  4:15 PM EDT -----  Contact: Miryam Iglesias   [phone]  MED REFILL:    [facility]       PHARMACY:  [facility]

## 2019-06-03 NOTE — TELEPHONE ENCOUNTER
PREV MESSAGE. WG/NL (E) PANTOPRAZOLE 40MG 1 QAM #30/2, FLOMAX 0.4MG 1 QD #30/2, GLIMEPIRIDE 1MG 1 QD #30/2.     Electronically signed by Camilla Barber MA on 6/3/2019 at 3:15 PM

## 2019-06-05 NOTE — TELEPHONE ENCOUNTER
----- Message from Zelalem Pickens sent at 6/5/2019 12:41 PM EDT -----  Contact: Jamie Plummer - GRANDDAUGHTER  [phone]  MED REFILL:  (DOESN'T HAVE ANYMORE REFILLS AND IS ALMOST OUT.)    PANTOPRAZOLE 40 MG    TAMSULOSIN 0.4 MG    GLIMEPRIRIDE 1 MG    PHARMACY:  [address] [place] [address]

## 2019-06-05 NOTE — TELEPHONE ENCOUNTER
[facility]IMANI CROWLEY TO [facility] REQ REFILLS ON 6/3/19 FOR THOSE 3 RF'S. RECEIPT CONFIRMED BY THE PHARM 6/3/19 3:17PM THAT THOSE REFILLS ARE THERE. ANY OTHER QUESTIONS, CALL OUR OFFICE.     Electronically signed by Erika Tesfaye MA on 6/5/2019 at 1:26 PM

## 2019-07-08 NOTE — PROGRESS NOTES
SURGERY         CURRENT MEDICATIONS  Current Outpatient Medications   Medication Sig Dispense Refill    glimepiride (AMARYL) 1 MG tablet Take 1 tablet by mouth daily 30 tablet 5    melatonin 5 MG TABS tablet Take 5 mg by mouth nightly Indications: OTC      Alcohol Swabs (ALCOHOL PREP) 70 % PADS Indications: UAD       pantoprazole (PROTONIX) 40 MG tablet Take 1 tablet by mouth daily (Patient taking differently: Take 40 mg by mouth every morning (before breakfast) ) 30 tablet 2    tamsulosin (FLOMAX) 0.4 MG capsule Take 1 capsule by mouth daily 30 capsule 2    carvedilol (COREG) 25 MG tablet Take 1 tablet by mouth 2 times daily (with meals) 60 tablet 5    Dulaglutide (TRULICITY) 1.5 EN/6.1PY SOPN Inject 1 pen into the skin once a week 4 pen 5    dapagliflozin (FARXIGA) 5 MG tablet Take 1 tablet by mouth every morning 30 tablet 5    Insulin Degludec (TRESIBA FLEXTOUCH) 200 UNIT/ML SOPN Inject 114 Units into the skin nightly       potassium chloride (MICRO-K) 10 MEQ extended release capsule Take 10 mEq by mouth daily      rivaroxaban (XARELTO) 20 MG TABS tablet Take 20 mg by mouth      CPAP Machine MISC 14 cm by CPAP route nightly      furosemide (LASIX) 40 MG tablet Take 1 tablet by mouth 2 times daily (Patient taking differently: Take 40 mg by mouth daily ) 60 tablet 0    nitroGLYCERIN (NITROSTAT) 0.4 MG SL tablet Place 1 tablet under the tongue every 5 minutes as needed for Chest pain 25 tablet 3    clopidogrel (PLAVIX) 75 MG tablet Take 1 tablet by mouth daily 30 tablet 3    zolpidem (AMBIEN) 5 MG tablet Take 5 mg by mouth nightly as needed for Sleep. Tresia Marguerite busPIRone (BUSPAR) 5 MG tablet Take 5 mg by mouth 2 times daily as needed (Anxiety)        No current facility-administered medications for this visit.         ALLERGIES  Allergies   Allergen Reactions    Amlodipine     Eliquis [Apixaban] Diarrhea    Norco [Hydrocodone-Acetaminophen] Nausea Only       PHYSICAL EXAM    /76 (Site: Right

## 2019-12-30 NOTE — TELEPHONE ENCOUNTER
One touch delica metor is acting up. Could he get another metor and if it's a different one he will need strips to go with it. Please call daughter when this is done.

## 2020-01-01 ENCOUNTER — TELEPHONE (OUTPATIENT)
Dept: FAMILY MEDICINE CLINIC | Age: 75
End: 2020-01-01

## 2020-01-01 ENCOUNTER — APPOINTMENT (OUTPATIENT)
Dept: GENERAL RADIOLOGY | Age: 75
End: 2020-01-01
Payer: MEDICARE

## 2020-01-01 ENCOUNTER — HOSPITAL ENCOUNTER (EMERGENCY)
Age: 75
End: 2020-01-05
Attending: EMERGENCY MEDICINE
Payer: MEDICARE

## 2020-01-01 VITALS
HEART RATE: 31 BPM | SYSTOLIC BLOOD PRESSURE: 48 MMHG | BODY MASS INDEX: 42.89 KG/M2 | WEIGHT: 283 LBS | OXYGEN SATURATION: 100 % | HEIGHT: 68 IN | DIASTOLIC BLOOD PRESSURE: 31 MMHG | RESPIRATION RATE: 14 BRPM

## 2020-01-01 LAB
BASOPHILS ABSOLUTE: 0 K/CU MM
BASOPHILS RELATIVE PERCENT: 0.3 % (ref 0–1)
DIFFERENTIAL TYPE: ABNORMAL
EOSINOPHILS ABSOLUTE: 0.1 K/CU MM
EOSINOPHILS RELATIVE PERCENT: 0.6 % (ref 0–3)
HCT VFR BLD CALC: 30.6 % (ref 42–52)
HEMOGLOBIN: 7.6 GM/DL (ref 13.5–18)
IMMATURE NEUTROPHIL %: 4.5 % (ref 0–0.43)
INR BLD: 9.28 INDEX
LYMPHOCYTES ABSOLUTE: 2.4 K/CU MM
LYMPHOCYTES RELATIVE PERCENT: 19.4 % (ref 24–44)
MCH RBC QN AUTO: 23.8 PG (ref 27–31)
MCHC RBC AUTO-ENTMCNC: 24.8 % (ref 32–36)
MCV RBC AUTO: 95.6 FL (ref 78–100)
MONOCYTES ABSOLUTE: 0.5 K/CU MM
MONOCYTES RELATIVE PERCENT: 4.1 % (ref 0–4)
NUCLEATED RBC %: 1.5 %
PDW BLD-RTO: 16.9 % (ref 11.7–14.9)
PLATELET # BLD: 196 K/CU MM (ref 140–440)
PMV BLD AUTO: 11.4 FL (ref 7.5–11.1)
PROTHROMBIN TIME: 114.9 SECONDS (ref 11.7–14.5)
RBC # BLD: 3.2 M/CU MM (ref 4.6–6.2)
REASON FOR REJECTION: NORMAL
REASON FOR REJECTION: NORMAL
REJECTED TEST: NORMAL
SEGMENTED NEUTROPHILS ABSOLUTE COUNT: 8.7 K/CU MM
SEGMENTED NEUTROPHILS RELATIVE PERCENT: 71.1 % (ref 36–66)
TOTAL IMMATURE NEUTOROPHIL: 0.56 K/CU MM
TOTAL NUCLEATED RBC: 0.2 K/CU MM
TROPONIN T: 0.02 NG/ML
WBC # BLD: 12.3 K/CU MM (ref 4–10.5)

## 2020-01-01 PROCEDURE — 2500000003 HC RX 250 WO HCPCS

## 2020-01-01 PROCEDURE — 6360000004 HC RX CONTRAST MEDICATION

## 2020-01-01 PROCEDURE — 96374 THER/PROPH/DIAG INJ IV PUSH: CPT

## 2020-01-01 PROCEDURE — 2580000003 HC RX 258: Performed by: EMERGENCY MEDICINE

## 2020-01-01 PROCEDURE — 6360000002 HC RX W HCPCS

## 2020-01-01 PROCEDURE — 2500000003 HC RX 250 WO HCPCS: Performed by: EMERGENCY MEDICINE

## 2020-01-01 PROCEDURE — 85610 PROTHROMBIN TIME: CPT

## 2020-01-01 PROCEDURE — 36415 COLL VENOUS BLD VENIPUNCTURE: CPT

## 2020-01-01 PROCEDURE — 6360000002 HC RX W HCPCS: Performed by: EMERGENCY MEDICINE

## 2020-01-01 PROCEDURE — 99291 CRITICAL CARE FIRST HOUR: CPT

## 2020-01-01 PROCEDURE — 85025 COMPLETE CBC W/AUTO DIFF WBC: CPT

## 2020-01-01 PROCEDURE — 2580000003 HC RX 258

## 2020-01-01 PROCEDURE — 93005 ELECTROCARDIOGRAM TRACING: CPT | Performed by: EMERGENCY MEDICINE

## 2020-01-01 PROCEDURE — 92950 HEART/LUNG RESUSCITATION CPR: CPT

## 2020-01-01 PROCEDURE — 4500000027

## 2020-01-01 PROCEDURE — 93010 ELECTROCARDIOGRAM REPORT: CPT | Performed by: INTERNAL MEDICINE

## 2020-01-01 PROCEDURE — 80053 COMPREHEN METABOLIC PANEL: CPT

## 2020-01-01 PROCEDURE — 84484 ASSAY OF TROPONIN QUANT: CPT

## 2020-01-01 PROCEDURE — 4500000030 HC CRITICAL CARE PROCEDURE

## 2020-01-01 RX ORDER — SODIUM CHLORIDE 9 MG/ML
INJECTION, SOLUTION INTRAVENOUS CONTINUOUS PRN
Status: COMPLETED | OUTPATIENT
Start: 2020-01-01 | End: 2020-01-01

## 2020-01-01 RX ORDER — ATROPINE SULFATE 0.1 MG/ML
INJECTION INTRAVENOUS DAILY PRN
Status: COMPLETED | OUTPATIENT
Start: 2020-01-01 | End: 2020-01-01

## 2020-01-01 RX ADMIN — SODIUM CHLORIDE 999 ML/HR: 9 INJECTION, SOLUTION INTRAVENOUS at 20:49

## 2020-01-01 RX ADMIN — EPINEPHRINE 1 MG: 0.1 INJECTION INTRACARDIAC; INTRAVENOUS at 20:21

## 2020-01-01 RX ADMIN — EPINEPHRINE 1 MG: 0.1 INJECTION INTRACARDIAC; INTRAVENOUS at 20:29

## 2020-01-01 RX ADMIN — EPINEPHRINE 1 MG: 0.1 INJECTION INTRACARDIAC; INTRAVENOUS at 20:24

## 2020-01-01 RX ADMIN — EPINEPHRINE 1 MG: 0.1 INJECTION INTRACARDIAC; INTRAVENOUS at 20:38

## 2020-01-01 RX ADMIN — EPINEPHRINE 2 MCG/MIN: 1 INJECTION, SOLUTION, CONCENTRATE INTRAVENOUS at 20:51

## 2020-01-01 RX ADMIN — EPINEPHRINE 1 MG: 0.1 INJECTION INTRACARDIAC; INTRAVENOUS at 20:32

## 2020-01-01 RX ADMIN — EPINEPHRINE 1 MG: 0.1 INJECTION INTRACARDIAC; INTRAVENOUS at 20:35

## 2020-01-01 RX ADMIN — EPINEPHRINE 1 MG: 0.1 INJECTION INTRACARDIAC; INTRAVENOUS at 20:41

## 2020-01-01 RX ADMIN — Medication 50 MEQ: at 20:54

## 2020-01-01 RX ADMIN — EPINEPHRINE 1 MG: 0.1 INJECTION INTRACARDIAC; INTRAVENOUS at 21:04

## 2020-01-01 RX ADMIN — EPINEPHRINE 30 MCG: 0.1 INJECTION INTRACARDIAC; INTRAVENOUS at 20:51

## 2020-01-01 RX ADMIN — SODIUM CHLORIDE 999 ML/HR: 9 INJECTION, SOLUTION INTRAVENOUS at 20:37

## 2020-01-01 RX ADMIN — ATROPINE SULFATE 0.5 MG: 0.1 INJECTION PARENTERAL at 20:48

## 2020-01-01 RX ADMIN — EPINEPHRINE 1 MG: 0.1 INJECTION INTRACARDIAC; INTRAVENOUS at 20:53

## 2020-01-01 RX ADMIN — EPINEPHRINE 1 MG: 0.1 INJECTION INTRACARDIAC; INTRAVENOUS at 21:01

## 2020-01-01 RX ADMIN — ATROPINE SULFATE 0.5 MG: 0.1 INJECTION PARENTERAL at 20:56

## 2020-01-01 RX ADMIN — ATROPINE SULFATE 0.5 MG: 0.1 INJECTION PARENTERAL at 20:43

## 2020-01-01 RX ADMIN — EPINEPHRINE 1 MG: 0.1 INJECTION INTRACARDIAC; INTRAVENOUS at 20:58

## 2020-01-06 NOTE — TELEPHONE ENCOUNTER
Received a call from the ED, Dr. Nina Thorne, DO that the pt had passed away in the ED due to cardiorespiratory arrest. Will inform pt's PCP, Dr. Adilia Alcantar, tomorrow 1/6/20.

## 2020-01-06 NOTE — CODE DOCUMENTATION
No cardiac activity on bedside US per Dr. Shanon Brown and Dr. Catalina Sutton. Dr. Roly Torres at bedside.  Time of death 2106

## 2020-01-06 NOTE — ED NOTES
Paged Dr Yari Rea @ 2130 -- Goyo Schneider PA-C returned call @ 2150 -- transferred call to Dr Mena Alvarez  01/05/20 [id number]

## 2020-01-06 NOTE — ED PROVIDER NOTES
Emergency Department Encounter  Location: [address] [place] [address] EMERGENCY DEPARTMENT    Patient: Agustin Santos  MRN: [id number]  : 1945  Date of evaluation: 2020  ED Provider: Phil Lizama DO    Chief Complaint:    No chief complaint on file. [place]:  Agustin Santos is a 66 y.o. male that presents to the emergency department In full cardiorespiratory arrest.   Patient apparently had been complaining of chest pain today and called 911. When EMS arrived, they had to convince him to come to the hospital.  He apparently walked to the squad and then collapsed on the cart. CPR initiated immediately. Patient intubated with an LMA prior to arrival.    ROS:  Limited ROS due to acuity.     Past Medical History:   Diagnosis Date    Acute metabolic encephalopathy     dx with admission 2018- also dx with Influenza A- recieved Tamiflu    CAD (coronary artery disease)     [facility]     [facility]abetic peripheral neuropathy [facility]     Diverticulosis of colon without diverticulitis     severe    GERD (gastroesophageal reflux disease)     H/O deep venous thrombosis     H/O percutaneous transluminal coronary angioplasty     H/O: CVA (cerebrovascular accident)     Hx of blood clots     Hyperlipidemia     Hypertension     ANA (obstructive sleep apnea)     CPAP     Past Surgical History:   Procedure Laterality Date    APPENDECTOMY      CATARACT REMOVAL Left     CHOLECYSTECTOMY      CORONARY ANGIOPLASTY WITH STENT PLACEMENT      per old chart pt had cath- angioplastyof left circimflex artery with stent placement done 2011()    CORONARY ANGIOPLASTY WITH STENT PLACEMENT      per old chart pt had cath and then angioplasty with stents to circumflex, OM and RCA done 2016()    EYE SURGERY      OTHER SURGICAL HISTORY Right 2018    Thyroid Biopsy    VARICOSE VEIN SURGERY       Family History   Problem Relation Age of Onset    Diabetes Mother    Saray Rigginserfield  2020 09:27:42 PM      Patient referred to: No follow-up provider specified.   Discharge medications:  Discharge Medication List as of 2020 11:27 PM        (Please note that portions of this note may have been completed with a voice recognition program. Efforts were made to edit the dictations but occasionally words are mis-transcribed.)    DO Harrison Bill DO  20 [id number]

## 2020-01-06 NOTE — ED NOTES
Daughter Elena Cooper and grand daughter Jhonatan Cuencasylvester at bedside.       Mere Parker RN  01/05/20 1803

## 2020-01-06 NOTE — PROGRESS NOTES
Responded to STEMI call   He was coded in ED for more than 10 mins , Excelent heroic effort from ED team  Pt has passed away.  Spoke with family   STEMI call cancelled

## 2020-01-06 NOTE — ED NOTES
Bed: 03TR-03  Expected date:   Expected time:   Means of arrival:   Comments:  74 M cardiac arrest, CPR in progress 5 min abdi Kowalski RN  01/05/20 2021

## 2020-01-17 LAB
EKG DIAGNOSIS: NORMAL
EKG Q-T INTERVAL: 534 MS
EKG QRS DURATION: 154 MS
EKG QTC CALCULATION (BAZETT): 377 MS
EKG R AXIS: -72 DEGREES
EKG T AXIS: 103 DEGREES
EKG VENTRICULAR RATE: 30 BPM